# Patient Record
Sex: MALE | Race: OTHER | HISPANIC OR LATINO | Employment: UNEMPLOYED | URBAN - METROPOLITAN AREA
[De-identification: names, ages, dates, MRNs, and addresses within clinical notes are randomized per-mention and may not be internally consistent; named-entity substitution may affect disease eponyms.]

---

## 2020-09-04 ENCOUNTER — OFFICE VISIT (OUTPATIENT)
Dept: FAMILY MEDICINE CLINIC | Facility: CLINIC | Age: 60
End: 2020-09-04

## 2020-09-04 VITALS
OXYGEN SATURATION: 98 % | HEART RATE: 58 BPM | TEMPERATURE: 98.3 F | BODY MASS INDEX: 30.66 KG/M2 | WEIGHT: 207 LBS | SYSTOLIC BLOOD PRESSURE: 136 MMHG | HEIGHT: 69 IN | DIASTOLIC BLOOD PRESSURE: 80 MMHG

## 2020-09-04 DIAGNOSIS — R35.0 URINARY FREQUENCY: Primary | ICD-10-CM

## 2020-09-04 LAB
SL AMB  POCT GLUCOSE, UA: NORMAL
SL AMB LEUKOCYTE ESTERASE,UA: NORMAL
SL AMB POCT BILIRUBIN,UA: NORMAL
SL AMB POCT BLOOD,UA: NORMAL
SL AMB POCT CLARITY,UA: CLEAR
SL AMB POCT COLOR,UA: YELLOW
SL AMB POCT HEMOGLOBIN AIC: 6 (ref ?–6.5)
SL AMB POCT KETONES,UA: NORMAL
SL AMB POCT NITRITE,UA: NORMAL
SL AMB POCT PH,UA: 6.5
SL AMB POCT SPECIFIC GRAVITY,UA: 1.01
SL AMB POCT URINE PROTEIN: NORMAL
SL AMB POCT UROBILINOGEN: 0.2

## 2020-09-04 PROCEDURE — 99203 OFFICE O/P NEW LOW 30 MIN: CPT | Performed by: NURSE PRACTITIONER

## 2020-09-04 PROCEDURE — 83036 HEMOGLOBIN GLYCOSYLATED A1C: CPT | Performed by: NURSE PRACTITIONER

## 2020-09-04 PROCEDURE — 81002 URINALYSIS NONAUTO W/O SCOPE: CPT | Performed by: NURSE PRACTITIONER

## 2020-09-04 RX ORDER — TAMSULOSIN HYDROCHLORIDE 0.4 MG/1
0.4 CAPSULE ORAL
Qty: 30 CAPSULE | Refills: 2 | Status: SHIPPED | OUTPATIENT
Start: 2020-09-04 | End: 2020-12-08 | Stop reason: SDUPTHER

## 2020-09-04 NOTE — PROGRESS NOTES
BMI Counseling: Body mass index is 31 02 kg/m²  The BMI is above normal  Nutrition recommendations include encouraging healthy choices of fruits and vegetables, decreasing fast food intake, consuming healthier snacks, limiting drinks that contain sugar, increasing intake of lean protein, reducing intake of saturated and trans fat and reducing intake of cholesterol  Exercise recommendations include moderate physical activity 150 minutes/week  Depression Screening and Follow-up Plan: Patient's depression screening was positive with a PHQ-2 score of 0  Clincally patient does not have depression  No treatment is required  Assessment/Plan:    Urinary frequency  -POCT urine normal free from infection  On exam found prostate to be mildly enlarged  Discussed with patient starting on flomax to trial out x 1 month and see if his symptoms resolve  A1c was also normal done in office  To f/u in 1 month for re-evaluation  Diagnoses and all orders for this visit:    Urinary frequency  -     POCT urine dip  -     tamsulosin (FLOMAX) 0 4 mg; Take 1 capsule (0 4 mg total) by mouth daily with dinner  -     POCT hemoglobin A1c          Subjective:      Patient ID: Josiane Manning is a 61 y o  male  61year old male patient here to establish care  PMHx: no signficant past medical history  Urinary Frequency    This is a new problem  The current episode started more than 1 month ago (few months)  The problem occurs every urination  The problem has been gradually worsening  The pain is at a severity of 0/10  The patient is experiencing no pain  There has been no fever  The fever has been present for less than 1 day  He is sexually active  There is no history of pyelonephritis  Associated symptoms include frequency  Pertinent negatives include no chills, discharge, flank pain, hematuria, nausea, urgency or vomiting  He has tried nothing for the symptoms  The treatment provided no relief   There is no history of catheterization, kidney stones, recurrent UTIs, a single kidney, urinary stasis or a urological procedure  The following portions of the patient's history were reviewed and updated as appropriate: allergies, current medications, past family history, past medical history, past social history, past surgical history and problem list     Review of Systems   Constitutional: Negative  Negative for chills, fatigue and fever  HENT: Negative  Eyes: Negative  Respiratory: Negative  Negative for cough, chest tightness, shortness of breath and wheezing  Cardiovascular: Negative  Negative for chest pain and palpitations  Gastrointestinal: Negative  Negative for nausea and vomiting  Endocrine: Negative  Genitourinary: Positive for frequency  Negative for flank pain, hematuria and urgency  Incomplete emptying and weak stream   Musculoskeletal: Negative  Skin: Negative  Allergic/Immunologic: Negative  Neurological: Negative  Negative for dizziness and headaches  Hematological: Negative  Psychiatric/Behavioral: Negative  Objective:      /80 (BP Location: Left arm, Patient Position: Sitting, Cuff Size: Adult)   Pulse 58   Temp 98 3 °F (36 8 °C) (Oral)   Ht 5' 8 5" (1 74 m)   Wt 93 9 kg (207 lb)   SpO2 98%   BMI 31 02 kg/m²          Physical Exam  Vitals signs and nursing note reviewed  Constitutional:       General: He is not in acute distress  Appearance: Normal appearance  He is obese  He is not ill-appearing, toxic-appearing or diaphoretic  HENT:      Head: Normocephalic and atraumatic  Right Ear: External ear normal       Left Ear: External ear normal       Nose: Nose normal  No congestion or rhinorrhea  Mouth/Throat:      Mouth: Mucous membranes are moist       Pharynx: Oropharynx is clear  No oropharyngeal exudate  Eyes:      Extraocular Movements: Extraocular movements intact        Conjunctiva/sclera: Conjunctivae normal       Pupils: Pupils are equal, round, and reactive to light  Neck:      Musculoskeletal: Normal range of motion and neck supple  Cardiovascular:      Rate and Rhythm: Normal rate and regular rhythm  Pulses: Normal pulses  Heart sounds: Normal heart sounds  Pulmonary:      Effort: Pulmonary effort is normal  No respiratory distress  Breath sounds: Normal breath sounds  Abdominal:      General: Bowel sounds are normal       Palpations: Abdomen is soft  Genitourinary:     Prostate: Enlarged  Rectum: Normal    Musculoskeletal: Normal range of motion  Skin:     General: Skin is warm and dry  Capillary Refill: Capillary refill takes less than 2 seconds  Neurological:      General: No focal deficit present  Mental Status: He is alert and oriented to person, place, and time  Psychiatric:         Mood and Affect: Mood normal          Behavior: Behavior normal          Thought Content:  Thought content normal          Judgment: Judgment normal

## 2020-09-08 PROBLEM — R35.0 URINARY FREQUENCY: Status: ACTIVE | Noted: 2020-09-08

## 2020-09-08 NOTE — ASSESSMENT & PLAN NOTE
-POCT urine normal free from infection  On exam found prostate to be mildly enlarged  Discussed with patient starting on flomax to trial out x 1 month and see if his symptoms resolve  A1c was also normal done in office  To f/u in 1 month for re-evaluation

## 2020-10-05 DIAGNOSIS — R97.20 ELEVATED PSA: Primary | ICD-10-CM

## 2020-10-16 ENCOUNTER — OFFICE VISIT (OUTPATIENT)
Dept: FAMILY MEDICINE CLINIC | Facility: CLINIC | Age: 60
End: 2020-10-16

## 2020-10-16 ENCOUNTER — TELEPHONE (OUTPATIENT)
Dept: UROLOGY | Facility: MEDICAL CENTER | Age: 60
End: 2020-10-16

## 2020-10-16 VITALS
BODY MASS INDEX: 32.43 KG/M2 | OXYGEN SATURATION: 98 % | DIASTOLIC BLOOD PRESSURE: 80 MMHG | HEART RATE: 78 BPM | HEIGHT: 68 IN | TEMPERATURE: 97.5 F | SYSTOLIC BLOOD PRESSURE: 132 MMHG | RESPIRATION RATE: 18 BRPM | WEIGHT: 214 LBS

## 2020-10-16 DIAGNOSIS — R97.20 ELEVATED PSA: ICD-10-CM

## 2020-10-16 DIAGNOSIS — R35.0 URINARY FREQUENCY: Primary | ICD-10-CM

## 2020-10-16 DIAGNOSIS — D69.6 THROMBOCYTOPENIA (HCC): ICD-10-CM

## 2020-10-16 PROCEDURE — 99213 OFFICE O/P EST LOW 20 MIN: CPT | Performed by: NURSE PRACTITIONER

## 2020-12-08 ENCOUNTER — CONSULT (OUTPATIENT)
Dept: UROLOGY | Age: 60
End: 2020-12-08

## 2020-12-08 VITALS
DIASTOLIC BLOOD PRESSURE: 88 MMHG | WEIGHT: 212 LBS | HEIGHT: 68 IN | SYSTOLIC BLOOD PRESSURE: 148 MMHG | BODY MASS INDEX: 32.13 KG/M2

## 2020-12-08 DIAGNOSIS — Z12.11 ENCOUNTER FOR SCREENING COLONOSCOPY: ICD-10-CM

## 2020-12-08 DIAGNOSIS — R97.20 ELEVATED PSA: Primary | ICD-10-CM

## 2020-12-08 DIAGNOSIS — N40.1 BENIGN PROSTATIC HYPERPLASIA WITH URINARY FREQUENCY: ICD-10-CM

## 2020-12-08 DIAGNOSIS — R35.0 BENIGN PROSTATIC HYPERPLASIA WITH URINARY FREQUENCY: ICD-10-CM

## 2020-12-08 PROCEDURE — 99244 OFF/OP CNSLTJ NEW/EST MOD 40: CPT | Performed by: UROLOGY

## 2020-12-08 RX ORDER — TAMSULOSIN HYDROCHLORIDE 0.4 MG/1
0.4 CAPSULE ORAL
Qty: 90 CAPSULE | Refills: 3 | Status: SHIPPED | OUTPATIENT
Start: 2020-12-08

## 2020-12-15 ENCOUNTER — TELEPHONE (OUTPATIENT)
Dept: GASTROENTEROLOGY | Facility: CLINIC | Age: 60
End: 2020-12-15

## 2020-12-21 ENCOUNTER — TELEPHONE (OUTPATIENT)
Dept: GASTROENTEROLOGY | Facility: CLINIC | Age: 60
End: 2020-12-21

## 2021-01-27 ENCOUNTER — TELEPHONE (OUTPATIENT)
Dept: GASTROENTEROLOGY | Facility: MEDICAL CENTER | Age: 61
End: 2021-01-27

## 2021-02-09 ENCOUNTER — OFFICE VISIT (OUTPATIENT)
Dept: UROLOGY | Age: 61
End: 2021-02-09

## 2021-02-09 VITALS
HEIGHT: 70 IN | DIASTOLIC BLOOD PRESSURE: 80 MMHG | BODY MASS INDEX: 30.35 KG/M2 | WEIGHT: 212 LBS | HEART RATE: 78 BPM | SYSTOLIC BLOOD PRESSURE: 130 MMHG

## 2021-02-09 DIAGNOSIS — N40.1 BENIGN PROSTATIC HYPERPLASIA WITH URINARY FREQUENCY: ICD-10-CM

## 2021-02-09 DIAGNOSIS — R97.20 ELEVATED PSA: Primary | ICD-10-CM

## 2021-02-09 DIAGNOSIS — R35.0 BENIGN PROSTATIC HYPERPLASIA WITH URINARY FREQUENCY: ICD-10-CM

## 2021-02-09 PROCEDURE — 99214 OFFICE O/P EST MOD 30 MIN: CPT | Performed by: UROLOGY

## 2021-02-09 RX ORDER — CIPROFLOXACIN 500 MG/1
TABLET, FILM COATED ORAL
Qty: 2 TABLET | Refills: 0 | Status: SHIPPED | OUTPATIENT
Start: 2021-03-08 | End: 2021-03-09

## 2021-02-09 NOTE — ASSESSMENT & PLAN NOTE
PSA has risen to 8 79 with 3% free  The risk of prostate cancer was discussed  I recommended to the patient that we proceed with transrectal ultrasound of the prostate with biopsy  The procedure was described  Risks were discussed

## 2021-02-09 NOTE — ASSESSMENT & PLAN NOTE
The patient notes that his voiding pattern has improved with the use of tamsulosin  He is satisfied his voiding pattern at this point and we will continue present therapy

## 2021-02-09 NOTE — PROGRESS NOTES
Assessment/Plan:    Elevated PSA  PSA has risen to 8 79 with 3% free  The risk of prostate cancer was discussed  I recommended to the patient that we proceed with transrectal ultrasound of the prostate with biopsy  The procedure was described  Risks were discussed  Benign prostatic hyperplasia with urinary frequency  The patient notes that his voiding pattern has improved with the use of tamsulosin  He is satisfied his voiding pattern at this point and we will continue present therapy  Diagnoses and all orders for this visit:    Elevated PSA  -     Biopsy prostate; Future  -     ciprofloxacin (CIPRO) 500 mg tablet; 1 pill orally 1 hour before prostate biopsy, 1 pill 12 hours post biopsy  Benign prostatic hyperplasia with urinary frequency          Subjective:      Patient ID: Milo Lin is a 61 y o  male  Benign Prostatic Hypertrophy  This is a new problem  The current episode started more than 1 month ago  The problem has been gradually improving since onset  Irritative symptoms include urgency  Nocturia: Nocturia x 2-3  Obstructive symptoms include a slower stream  Obstructive symptoms do not include incomplete emptying, an intermittent stream, straining or a weak stream  Pertinent negatives include no chills, dysuria, genital pain, hematuria, hesitancy, nausea or vomiting  He is sexually active  The symptoms are aggravated by alcohol  Past treatments include tamsulosin  The treatment provided mild relief  The following portions of the patient's history were reviewed and updated as appropriate: allergies, current medications, past family history, past medical history, past social history, past surgical history and problem list     Review of Systems   Constitutional: Negative for chills, diaphoresis, fatigue and fever  HENT: Negative  Eyes: Negative  Respiratory: Negative  Cardiovascular: Negative  Gastrointestinal: Negative  Negative for nausea and vomiting          Never had colonoscopy   Endocrine: Negative  Genitourinary: Positive for urgency  Negative for dysuria, hematuria, hesitancy and incomplete emptying  Nocturia: Nocturia x 2-3  See HPI   Musculoskeletal: Negative  Skin: Negative  Allergic/Immunologic: Negative  Neurological: Negative  Hematological: Negative  Psychiatric/Behavioral: Negative  Objective:      /80   Pulse 78   Ht 5' 10" (1 778 m)   Wt 96 2 kg (212 lb)   BMI 30 42 kg/m²          Physical Exam  Vitals signs reviewed  Constitutional:       General: He is not in acute distress  Appearance: Normal appearance  He is well-developed  He is not ill-appearing, toxic-appearing or diaphoretic  HENT:      Head: Normocephalic and atraumatic  Eyes:      General: No scleral icterus  Conjunctiva/sclera: Conjunctivae normal    Neck:      Musculoskeletal: Neck supple  Cardiovascular:      Rate and Rhythm: Normal rate  Pulmonary:      Effort: Pulmonary effort is normal    Abdominal:      General: Bowel sounds are normal  There is no distension  Palpations: Abdomen is soft  There is no mass  Tenderness: There is no abdominal tenderness  There is no right CVA tenderness, left CVA tenderness, guarding or rebound  Hernia: No hernia is present  Comments: Large midline incision well-healed   Genitourinary:     Penis: No phimosis or hypospadias  Scrotum/Testes:         Right: Mass not present  Left: Mass not present  Musculoskeletal: Normal range of motion  Skin:     General: Skin is warm and dry  Neurological:      General: No focal deficit present  Mental Status: He is alert and oriented to person, place, and time  Psychiatric:         Mood and Affect: Mood normal          Behavior: Behavior normal          Thought Content:  Thought content normal          Judgment: Judgment normal

## 2021-02-09 NOTE — PATIENT INSTRUCTIONS
Prostate Biopsy   WHAT YOU NEED TO KNOW:   What do I need to know about a prostate biopsy? A prostate biopsy is a procedure to remove samples of tissue from your prostate gland  The prostate is a male sex gland that makes fluid found in semen  It is located just below the bladder  After the samples are removed, they are sent to a lab and tested for cancer  How do I prepare for a prostate biopsy? · Your healthcare provider will talk to you about how to prepare for this procedure  You will need to stop taking blood thinners several days before your procedure  Examples of blood thinners include warfarin and NSAIDs  You may also need to stop taking herbal supplements before your procedure  Your provider may tell you to shower the night before your surgery  He or she may tell you to use a certain soap to help prevent a surgical site infection  Your provider may tell you not to eat or drink anything after midnight on the day of your surgery  He or she will tell you what other medicines to take or not take on the day of your procedure  · You will be given an antibiotic to help prevent a bacterial infection  You may need to give yourself an enema (liquid medicine put in your rectum) to help empty your bowel before your procedure  What will happen during a prostate biopsy? · You may need to lie on your side with your knees pulled up toward your chest  Numbing cream or gel may be put into your rectum, or numbing medicine may be injected near your prostate  You may instead be given general anesthesia to keep you asleep and free from pain during the procedure  A biopsy sample may be taken through your rectum, urethra, or perineum  The perineum is the area between your scrotum and rectum  Most of the time, biopsy samples are taken through the rectum  · If your healthcare provider takes a sample through your rectum, he will insert a small ultrasound probe into your rectum   The ultrasound shows pictures of your prostate on a monitor, and is used to help guide the biopsy needle  Your healthcare provider will push the biopsy needle through the wall of your rectum and into your prostate gland  Your healthcare provider will remove between 6 to 12 samples of tissue from different areas of your prostate gland  The samples will be sent to a lab and tested for cancer  What will happen after a prostate biopsy? You may feel soreness at the biopsy site  You may need to take antibiotics for up to 2 days after your procedure to help prevent an infection  You may have some bleeding from your rectum  You may also have blood in your urine, bowel movements, or semen  What are the risks of a prostate biopsy? You may bleed more than expected or get an infection in your urinary tract or prostate gland  The infection may spread to your blood and the rest of your body  Your bladder may not empty completely when you urinate  You may need a catheter to help empty your bladder for a short period of time  Cancer cells may be missed during your biopsy procedure  You may need another prostate biopsy to check for cancer again  CARE AGREEMENT:   You have the right to help plan your care  Learn about your health condition and how it may be treated  Discuss treatment options with your healthcare providers to decide what care you want to receive  You always have the right to refuse treatment  The above information is an  only  It is not intended as medical advice for individual conditions or treatments  Talk to your doctor, nurse or pharmacist before following any medical regimen to see if it is safe and effective for you  © Copyright [address] Information is for End User's use only and may not be sold, redistributed or otherwise used for commercial purposes   All illustrations and images included in CareNotes® are the copyrighted property of [employer]  or [address] [place], [place] [address]

## 2021-02-10 ENCOUNTER — TELEPHONE (OUTPATIENT)
Dept: FAMILY MEDICINE CLINIC | Facility: CLINIC | Age: 61
End: 2021-02-10

## 2021-04-26 ENCOUNTER — PROCEDURE VISIT (OUTPATIENT)
Dept: UROLOGY | Facility: MEDICAL CENTER | Age: 61
End: 2021-04-26

## 2021-04-26 VITALS
SYSTOLIC BLOOD PRESSURE: 132 MMHG | DIASTOLIC BLOOD PRESSURE: 82 MMHG | WEIGHT: 212 LBS | BODY MASS INDEX: 30.35 KG/M2 | HEIGHT: 70 IN

## 2021-04-26 DIAGNOSIS — R97.20 ELEVATED PSA: Primary | ICD-10-CM

## 2021-04-26 PROCEDURE — 96372 THER/PROPH/DIAG INJ SC/IM: CPT | Performed by: UROLOGY

## 2021-04-26 PROCEDURE — 88342 IMHCHEM/IMCYTCHM 1ST ANTB: CPT | Performed by: PATHOLOGY

## 2021-04-26 PROCEDURE — 88344 IMHCHEM/IMCYTCHM EA MLT ANTB: CPT | Performed by: PATHOLOGY

## 2021-04-26 PROCEDURE — 55700 PR BIOPSY OF PROSTATE,NEEDLE/PUNCH: CPT | Performed by: UROLOGY

## 2021-04-26 PROCEDURE — G0416 PROSTATE BIOPSY, ANY MTHD: HCPCS | Performed by: PATHOLOGY

## 2021-04-26 PROCEDURE — 76942 ECHO GUIDE FOR BIOPSY: CPT | Performed by: UROLOGY

## 2021-04-26 RX ORDER — CEFTRIAXONE SODIUM 250 MG/1
250 INJECTION, POWDER, FOR SOLUTION INTRAMUSCULAR; INTRAVENOUS ONCE
Status: DISCONTINUED | OUTPATIENT
Start: 2021-04-26 | End: 2021-04-26

## 2021-04-26 RX ORDER — CEFTRIAXONE 1 G/1
1000 INJECTION, POWDER, FOR SOLUTION INTRAMUSCULAR; INTRAVENOUS ONCE
Status: COMPLETED | OUTPATIENT
Start: 2021-04-26 | End: 2021-04-26

## 2021-04-26 RX ADMIN — CEFTRIAXONE 1000 MG: 1 INJECTION, POWDER, FOR SOLUTION INTRAMUSCULAR; INTRAVENOUS at 11:17

## 2021-04-26 NOTE — LETTER
April 26, 2021     Dvae Zimmerman MD  [address]  [address] [address]  [address]    Patient: Jose Armando Bazzi   YOB: 1960   Date of Visit: 4/26/2021       Dear Dr Montana Santo: Thank you for referring Jose Armando Bazzi to me for evaluation  Below are my notes for this consultation  If you have questions, please do not hesitate to call me  I look forward to following your patient along with you  Sincerely,        Daniela Nguyễn MD        CC: No Recipients  Daniela Nguyễn MD  4/26/2021 10:43 AM  Sign when Signing Visit        Biopsy prostate     Date/Time 4/26/2021 10:39 AM     Performed by  Daniela Nguyễn MD     Authorized by Daniela Nguyễn MD      Universal Protocol   Consent: Verbal consent obtained  Written consent obtained  Risks and benefits: risks, benefits and alternatives were discussed  Consent given by: patient  Patient consent: the patient's understanding of the procedure matches consent given  Required items: required blood products, implants, devices, and special equipment available  Patient identity confirmed: verbally with patient        Local anesthesia used: yes     Anesthesia   Local anesthesia used: yes  Local Anesthetic: lidocaine 1% without epinephrine  Anesthetic total: 10 mL     Sedation   Patient sedated: no        Specimen: yes    Culture: no   Procedure Details   Procedure Notes: The patient was brought to the procedure room and properly identified  Gentamicin was given intramuscularly as ordered  The patient was then placed in the left lateral position  Digital rectal examination was performed  JOHN reveals  Moderately enlarged benign-feeling prostate  Betadine was instilled into the rectum  The transrectal ultrasound probe was placed in the rectum  Transrectal ultrasound of prostate then commenced  Findings on ultrasound:  Normal seminal vesicles  No hypoechoic lesion is noted the peripheral zone    The transition zone is enlarged and Stood with patient at the bedside to obtain standing vital signs. Patient denies dizziness, lightheadedness, or weakness. Patient declines assistance getting dressed. Call light within reach of patient.      heterogeneous in echotexture  Prostate size is 26 g  After completion  of the ultrasound a prostate block was administered in standard fashion using 2% xylocaine without epinephrine and a spinal needle  Transrectal ultrasound-guided biopsies were then obtained  12 biopsies were obtained with standard template  Biopsies were directed medially and laterally on both sides of the gland at the base, mid gland and apex  No nina prostatic hematoma was noted  The procedure was well tolerated  The patient was given discharge instructions  He left the procedure room in satisfactory condition    Patient Transportation: confirmed  Patient tolerance: patient tolerated the procedure well with no immediate complications

## 2021-04-26 NOTE — PATIENT INSTRUCTIONS
Prostate Gland Needle Biopsy   WHAT YOU NEED TO KNOW:   A prostate gland needle biopsy is a procedure to remove samples of tissue from your prostate gland  The prostate is a gland located just below the bladder and surrounds the urethra (tube that carries urine out of the body)  DISCHARGE INSTRUCTIONS:   Seek care immediately if:   · You bleed from your rectum  · You urinate very little or not at all  · You have severe pain, even after you take pain medicine  Call your doctor or surgeon if:   · You feel pain or burning when you urinate  · You feel weak, and your face is hot and red  · You have a fever or chills  · You see blood in your urine, bowel movements, or semen  · Your urine is cloudy or smells foul  · You cannot get an erection  · You have questions or concerns about your condition or care  Medicines: You may need any of the following:  · NSAIDs , such as ibuprofen, help decrease swelling, pain, and fever  NSAIDs can cause stomach bleeding or kidney problems in certain people  If you take blood thinner medicine, always ask your healthcare provider if NSAIDs are safe for you  Always read the medicine label and follow directions  · Antibiotics  help treat or prevent an infection caused by bacteria  · Prescription pain medicine  may be given  Ask your healthcare provider how to take this medicine safely  Some prescription pain medicines contain acetaminophen  Do not take other medicines that contain acetaminophen without talking to your healthcare provider  Too much acetaminophen may cause liver damage  Prescription pain medicine may cause constipation  Ask your healthcare provider how to prevent or treat constipation  · Take your medicine as directed  Contact your healthcare provider if you think your medicine is not helping or if you have side effects  Tell him or her if you are allergic to any medicine   Keep a list of the medicines, vitamins, and herbs you take  Include the amounts, and when and why you take them  Bring the list or the pill bottles to follow-up visits  Carry your medicine list with you in case of an emergency  Eat a variety of healthy foods:  Healthy foods include fruits, vegetables, whole-grain breads, low-fat dairy products, beans, lean meats, and fish  Eat foods low in animal fat and saturated fats to help decrease your risk for prostate cancer  Follow up with your doctor or surgeon as directed:  Write down your questions so you remember to ask them during your visits  © Copyright [address] [place] [address] Drive Information is for End User's use only and may not be sold, redistributed or otherwise used for commercial purposes  All illustrations and images included in CareNotes® are the copyrighted property of [employer]  or [facility] [employer]   The above information is an  only  It is not intended as medical advice for individual conditions or treatments  Talk to your doctor, nurse or pharmacist before following any medical regimen to see if it is safe and effective for you

## 2021-04-26 NOTE — PROGRESS NOTES
Biopsy prostate     Date/Time 4/26/2021 10:39 AM     Performed by  Jana Pruett MD     Authorized by Jana Pruett MD      Universal Protocol   Consent: Verbal consent obtained  Written consent obtained  Risks and benefits: risks, benefits and alternatives were discussed  Consent given by: patient  Patient consent: the patient's understanding of the procedure matches consent given  Required items: required blood products, implants, devices, and special equipment available  Patient identity confirmed: verbally with patient        Local anesthesia used: yes     Anesthesia   Local anesthesia used: yes  Local Anesthetic: lidocaine 1% without epinephrine  Anesthetic total: 10 mL     Sedation   Patient sedated: no        Specimen: yes    Culture: no   Procedure Details   Procedure Notes: The patient was brought to the procedure room and properly identified  Rocephin was given intramuscularly as ordered  Oral ciprofloxacin was given preoperatively as well  The patient was then placed in the left lateral position  Digital rectal examination was performed  JOHN reveals  Moderately enlarged benign-feeling prostate  Betadine was instilled into the rectum  The transrectal ultrasound probe was placed in the rectum  Transrectal ultrasound of prostate then commenced  Findings on ultrasound:  Normal seminal vesicles  No hypoechoic lesion is noted the peripheral zone  The transition zone is enlarged and heterogeneous in echotexture  Prostate size is 26 g  After completion  of the ultrasound a prostate block was administered in standard fashion using 2% xylocaine without epinephrine and a spinal needle  Transrectal ultrasound-guided biopsies were then obtained  12 biopsies were obtained with standard template  Biopsies were directed medially and laterally on both sides of the gland at the base, mid gland and apex  No nina prostatic hematoma was noted  The procedure was well tolerated  The patient was given discharge instructions  He will take another dose of ciprofloxacin in approximately 12 hours  He left the procedure room in satisfactory condition    Patient Transportation: confirmed  Patient tolerance: patient tolerated the procedure well with no immediate complications

## 2021-05-11 ENCOUNTER — OFFICE VISIT (OUTPATIENT)
Dept: UROLOGY | Facility: MEDICAL CENTER | Age: 61
End: 2021-05-11

## 2021-05-11 VITALS
DIASTOLIC BLOOD PRESSURE: 80 MMHG | SYSTOLIC BLOOD PRESSURE: 130 MMHG | HEIGHT: 70 IN | HEART RATE: 69 BPM | WEIGHT: 209 LBS | BODY MASS INDEX: 29.92 KG/M2

## 2021-05-11 DIAGNOSIS — C61 PROSTATE CANCER (HCC): Primary | ICD-10-CM

## 2021-05-11 PROCEDURE — 99214 OFFICE O/P EST MOD 30 MIN: CPT | Performed by: UROLOGY

## 2021-05-11 NOTE — PROGRESS NOTES
Chief complaint:  Elevated PSA    HPI:  57-year-old male followed for an elevated PSA  He recently underwent transrectal ultrasound with prostate biopsy  The procedure was well tolerated  He does note he is voiding a little more frequently but has no dysuria or gross hematuria  There is no fever or flank pain  He is here today to review his pathology  He is accompanied by his sister who speaks [place] well  PSA:  8 79    Transrectal ultrasound:  26 g prostate without hypoechoic lesion    Prostate biopsies:  7 of the 12 biopsies were positive for prostate cancer with maximum Pax score of 4+4=8  I had a long talk with the patient and his sister and reviewed the pathology report  He has high risk prostate cancer  We reviewed the NCCN guidelines  I recommended obtaining a bone scan and a CT scan  He will then return for a discussion regarding treatment options  We did have an initial talk a regarding treatment options for prostate cancer  Specifically we discussed robotic prostatectomy, radiation therapy options as well as medical options should the cancer have spread  At the conclusion were discussion the patient understood the nature of the prostate cancer  He will obtain his testing and return for further discussions

## 2021-05-12 ENCOUNTER — TELEPHONE (OUTPATIENT)
Dept: FAMILY MEDICINE CLINIC | Facility: CLINIC | Age: 61
End: 2021-05-12

## 2021-05-14 ENCOUNTER — APPOINTMENT (OUTPATIENT)
Dept: LAB | Facility: HOSPITAL | Age: 61
End: 2021-05-14
Attending: UROLOGY

## 2021-05-14 DIAGNOSIS — C61 PROSTATE CANCER (HCC): ICD-10-CM

## 2021-05-14 LAB
ALBUMIN SERPL BCP-MCNC: 3.6 G/DL (ref 3.5–5)
ALP SERPL-CCNC: 107 U/L (ref 46–116)
ALT SERPL W P-5'-P-CCNC: 33 U/L (ref 12–78)
ANION GAP SERPL CALCULATED.3IONS-SCNC: 11 MMOL/L (ref 4–13)
AST SERPL W P-5'-P-CCNC: 18 U/L (ref 5–45)
BILIRUB SERPL-MCNC: 0.8 MG/DL (ref 0.2–1)
BUN SERPL-MCNC: 13 MG/DL (ref 5–25)
CALCIUM SERPL-MCNC: 8.9 MG/DL (ref 8.3–10.1)
CHLORIDE SERPL-SCNC: 106 MMOL/L (ref 100–108)
CO2 SERPL-SCNC: 26 MMOL/L (ref 21–32)
CREAT SERPL-MCNC: 1.05 MG/DL (ref 0.6–1.3)
GFR SERPL CREATININE-BSD FRML MDRD: 76 ML/MIN/1.73SQ M
GLUCOSE SERPL-MCNC: 100 MG/DL (ref 65–140)
POTASSIUM SERPL-SCNC: 4.4 MMOL/L (ref 3.5–5.3)
PROT SERPL-MCNC: 8 G/DL (ref 6.4–8.2)
SODIUM SERPL-SCNC: 143 MMOL/L (ref 136–145)

## 2021-05-14 PROCEDURE — 80053 COMPREHEN METABOLIC PANEL: CPT

## 2021-05-14 PROCEDURE — 36415 COLL VENOUS BLD VENIPUNCTURE: CPT

## 2021-05-17 ENCOUNTER — HOSPITAL ENCOUNTER (OUTPATIENT)
Dept: CT IMAGING | Facility: HOSPITAL | Age: 61
Discharge: HOME | End: 2021-05-17
Attending: UROLOGY

## 2021-05-17 ENCOUNTER — HOSPITAL ENCOUNTER (OUTPATIENT)
Dept: RADIOLOGY | Facility: HOSPITAL | Age: 61
Discharge: HOME | End: 2021-05-17
Attending: UROLOGY

## 2021-05-17 DIAGNOSIS — C61 PROSTATE CANCER (HCC): ICD-10-CM

## 2021-05-17 PROCEDURE — 74177 CT ABD & PELVIS W/CONTRAST: CPT

## 2021-05-17 PROCEDURE — G1004 CDSM NDSC: HCPCS

## 2021-05-17 PROCEDURE — A9503 TC99M MEDRONATE: HCPCS

## 2021-05-17 PROCEDURE — 78306 BONE IMAGING WHOLE BODY: CPT

## 2021-05-17 RX ADMIN — IOHEXOL 100 ML: 350 INJECTION, SOLUTION INTRAVENOUS at 11:26

## 2021-05-21 ENCOUNTER — TELEPHONE (OUTPATIENT)
Dept: UROLOGY | Facility: AMBULATORY SURGERY CENTER | Age: 61
End: 2021-05-21

## 2021-05-21 NOTE — TELEPHONE ENCOUNTER
Patient's sister Rosie Hernandez called the office today to see if we got results of imaging yet  Bone scan was read, but CT has yet to be read, but when that is completed, she would like a call back to discuss next steps in her brother's care  Thank you!

## 2021-05-21 NOTE — RESULT ENCOUNTER NOTE
Inform pt that the  test was normal   He needs appointment to discuss treatment of his prostate cancer

## 2021-05-21 NOTE — TELEPHONE ENCOUNTER
Spoke to pt's sister Karthik Bonilla [phone] and advised CT results are not finalized yet but that Radiology was called and the results will be forthcoming  Will contact pt to schedule OV with Dr Alma Mendez to discuss CT report

## 2021-05-24 NOTE — TELEPHONE ENCOUNTER
FYI: No action required  Patients sister returned call   Patient has been scheduled with Dr Josiane Morel 30min slot on 5/25 at 2:30pm

## 2021-05-24 NOTE — TELEPHONE ENCOUNTER
Call placed to sister Landon Primrose  [facility] for her to contact the office to schedule a follow up appointment with Dr Dakota Miguel to review CT scan and NM bone scan  Office number was provided for call back       **It sister or patient calls back, please offer her an appointment tomorrow with Dr Dakota Miguel for discussion in 30 min slot**

## 2021-05-25 ENCOUNTER — OFFICE VISIT (OUTPATIENT)
Dept: UROLOGY | Facility: MEDICAL CENTER | Age: 61
End: 2021-05-25

## 2021-05-25 VITALS
BODY MASS INDEX: 29.92 KG/M2 | SYSTOLIC BLOOD PRESSURE: 128 MMHG | DIASTOLIC BLOOD PRESSURE: 78 MMHG | HEIGHT: 70 IN | WEIGHT: 209 LBS

## 2021-05-25 DIAGNOSIS — C61 PROSTATE CANCER (HCC): Primary | ICD-10-CM

## 2021-05-25 PROCEDURE — 99214 OFFICE O/P EST MOD 30 MIN: CPT | Performed by: UROLOGY

## 2021-05-25 NOTE — PROGRESS NOTES
Chief complaint:  Prostate cancer    HPI:  28-year-old male recently diagnosed with clinically localized high-risk prostate cancer  He recently completed a bone scan and a CT scan and returns today to discuss treatment options  He reports that he feels well there has been no change in his voiding symptoms  There is no gross hematuria, dysuria or symptoms of infection  He does have significant lower urinary tract symptoms with AUA symptom score of 23 with quality of life of 6  A bone scan is negative for evidence of metastatic disease  A CT scan is negative for evidence of metastatic disease  AUA SYMPTOM SCORE      Most Recent Value   AUA SYMPTOM SCORE   How often have you had a sensation of not emptying your bladder completely after you finished urinating? 3   How often have you had to urinate again less than two hours after you finished urinating? 3   How often have you found you stopped and started again several times when you urinate? 4   How often have you found it difficult to postpone urination? 3   How often have you had a weak urinary stream?  4   How often have you had to push or strain to begin urination? 2   How many times did you most typically get up to urinate from the time you went to bed at night until the time you got up in the morning? 4   Quality of Life: If you were to spend the rest of your life with your urinary condition just the way it is now, how would you feel about that?  6   AUA SYMPTOM SCORE  23          I had a long discussion with the patient and his family regarding the treatment of clinically localized prostate cancer  Specifically we discussed IMRT with neoadjuvant hormonal therapy and radical prostatectomy  The pros and cons of each were described  The risks of each were discussed    At the conclusion were discussion I recommended to the patient that he seek consultation with a robotic surgeon and with the radiation oncologist   Referrals were placed to Radiation Oncology  The patient will follow-up with urology  later in the week to discuss robotic surgery  He knows I would be happy to see him in the future to discuss treatment options further if he needs help making a decision

## 2021-05-25 NOTE — PATIENT INSTRUCTIONS
Prostate Cancer, Ambulatory Care   GENERAL INFORMATION:   Prostate cancer, Ambulatory Care develops in the male sex gland that helps make semen (prostate)  It is about the size of a walnut and wraps around the urethra  The urethra is the tube that carries urine from the bladder to the end of the penis  In most cases, prostate cancer is slow growing  Common symptoms include the following:   · Trouble starting or stopping the flow of urine    · Feeling the need to urinate often, especially at night    · Pain or a burning feeling when you urinate or ejaculate semen    · Trouble having an erection    · Blood in your urine or semen    · Not being able to urinate at all    · Pain or stiffness in your lower back, hips, or upper thighs  Seek immediate care for the following symptoms:   · Chest pain when you take a deep breath or cough    · Coughing up blood    · Suddenly feeling lightheaded and short of breath    · Your leg feels warm, tender, and painful  It may look swollen and red  Treatment for prostate cancer: If you have early stage cancer, your healthcare provider may recommend that you have frequent tests and regular follow-up visits to watch for changes  You may also need any of the following:  · Hormone therapy  is medicine used to decrease testosterone (male hormone) levels  · Radiation therapy  is used to kill cancer cells with high-energy x-rays beams  You may receive radiation therapy from outside your body or from small beads or rods placed inside your prostate  · Surgery  may be needed, depending on the stage of the cancer  Part or all of your prostate may be removed  You may also need to have some lymph nodes taken out  This may help keep the cancer from spreading to other parts of your body  Manage your prostate cancer:   · Eat a variety of healthy foods  Healthy foods include fruits, vegetables, whole-grain breads, low-fat dairy products, beans, lean meats, and fish   Your healthcare provider may also recommend changes to the amounts of calcium and vitamin D you have each day  · Manage your weight  Obesity may increase your risk for problems from prostate cancer  Limit or do not have high-calorie foods or drinks  · Exercise as directed  Exercise may help you recover after treatment and may help prevent your prostate cancer from returning  Exercise can also help you manage your weight  Try to get at least 30 minutes of exercise 5 days a week, such as walking  · Do not smoke  If you smoke, it is never too late to quit  Smoking increases the risk that your prostate cancer will return after treatment  Smoking also increases your risk for other types of cancer  Ask your healthcare provider for information if you need help quitting  · Limit or do not drink alcohol  If you drink, limit alcohol to 2 drinks per day  A drink is 12 ounces of beer, 1½ ounces of liquor, or 5 ounces of wine  Follow up with your urologist or oncologist as directed:  Write down your questions so you remember to ask them during your visits  CARE AGREEMENT:   You have the right to help plan your care  Learn about your health condition and how it may be treated  Discuss treatment options with your caregivers to decide what care you want to receive  You always have the right to refuse treatment  The above information is an  only  It is not intended as medical advice for individual conditions or treatments  Talk to your doctor, nurse or pharmacist before following any medical regimen to see if it is safe and effective for you  © 2014 [address] is for End User's use only and may not be sold, redistributed or otherwise used for commercial purposes  All illustrations and images included in CareNotes® are the copyrighted property of A D A M , Inc  or Jeremi García

## 2021-05-27 ENCOUNTER — OFFICE VISIT (OUTPATIENT)
Dept: UROLOGY | Facility: CLINIC | Age: 61
End: 2021-05-27

## 2021-05-27 ENCOUNTER — TELEPHONE (OUTPATIENT)
Dept: RADIATION ONCOLOGY | Facility: CLINIC | Age: 61
End: 2021-05-27

## 2021-05-27 VITALS
DIASTOLIC BLOOD PRESSURE: 80 MMHG | SYSTOLIC BLOOD PRESSURE: 140 MMHG | HEIGHT: 70 IN | WEIGHT: 209 LBS | BODY MASS INDEX: 29.92 KG/M2

## 2021-05-27 DIAGNOSIS — C61 PROSTATE CANCER (HCC): Primary | ICD-10-CM

## 2021-05-27 PROCEDURE — 99215 OFFICE O/P EST HI 40 MIN: CPT | Performed by: UROLOGY
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
## 2021-06-02 ENCOUNTER — TELEPHONE (OUTPATIENT)
Dept: RADIATION ONCOLOGY | Facility: CLINIC | Age: 61
End: 2021-06-02

## 2021-06-02 PROBLEM — C61 PROSTATE CANCER (HCC): Status: ACTIVE | Noted: 2021-06-02

## 2021-06-03 ENCOUNTER — RADIATION ONCOLOGY CONSULT (OUTPATIENT)
Dept: RADIATION ONCOLOGY | Facility: CLINIC | Age: 61
End: 2021-06-03
Attending: RADIOLOGY

## 2021-06-03 ENCOUNTER — TELEPHONE (OUTPATIENT)
Dept: UROLOGY | Facility: CLINIC | Age: 61
End: 2021-06-03

## 2021-06-03 ENCOUNTER — CLINICAL SUPPORT (OUTPATIENT)
Dept: RADIATION ONCOLOGY | Facility: CLINIC | Age: 61
End: 2021-06-03
Attending: RADIOLOGY

## 2021-06-03 ENCOUNTER — TELEPHONE (OUTPATIENT)
Dept: RADIATION ONCOLOGY | Facility: CLINIC | Age: 61
End: 2021-06-03

## 2021-06-03 VITALS
HEIGHT: 70 IN | HEART RATE: 68 BPM | OXYGEN SATURATION: 99 % | DIASTOLIC BLOOD PRESSURE: 86 MMHG | SYSTOLIC BLOOD PRESSURE: 128 MMHG | TEMPERATURE: 97.2 F | WEIGHT: 203.26 LBS | BODY MASS INDEX: 29.1 KG/M2 | RESPIRATION RATE: 18 BRPM

## 2021-06-03 DIAGNOSIS — C61 PROSTATE CANCER (HCC): Primary | ICD-10-CM

## 2021-06-03 PROCEDURE — 99211 OFF/OP EST MAY X REQ PHY/QHP: CPT | Performed by: RADIOLOGY

## 2021-06-03 NOTE — TELEPHONE ENCOUNTER
Called [phone] and left message for patient stating to keep his 06/04/2021 at 730 am appointment with Dr Geoffrey Izaguirre

## 2021-06-03 NOTE — TELEPHONE ENCOUNTER
----- Message from Champ Hopkins MD sent at 6/3/2021  2:39 PM EDT -----  FYI-should probably still see patient and make plan for treatment despite insurance issue  ----- Message -----  From: Orlando Chang RN  Sent: 6/3/2021   1:31 PM EDT  To: Champ Hopkins MD, Rosio Wyatt, ISACC, #    Hi,    Fidel saw Radiation Oncology today and is scheduled with Dr Guero Tobin tomorrow  He currently does not have insurance  He told the  in [address] [place] [address] today that he applied for MA but has not heard back from them  He will need either Radiation therapy or surgery for treatment of his prostate cancer  Would you be able to assist this patient?      Thank you,  KAYLEE Geronimo Nurse Navigator

## 2021-06-03 NOTE — TELEPHONE ENCOUNTER
Per Dr Andrew Lundy, patient to receive ADT, [employer], and markers  He is leaning towards RT over surgery, but will make final decision at Dr Arndt Organ appt tomorrow

## 2021-06-04 ENCOUNTER — OFFICE VISIT (OUTPATIENT)
Dept: UROLOGY | Facility: CLINIC | Age: 61
End: 2021-06-04

## 2021-06-04 ENCOUNTER — DOCUMENTATION (OUTPATIENT)
Dept: HEMATOLOGY ONCOLOGY | Facility: CLINIC | Age: 61
End: 2021-06-04

## 2021-06-04 VITALS
SYSTOLIC BLOOD PRESSURE: 132 MMHG | WEIGHT: 205.2 LBS | HEART RATE: 69 BPM | BODY MASS INDEX: 29.38 KG/M2 | HEIGHT: 70 IN | DIASTOLIC BLOOD PRESSURE: 88 MMHG

## 2021-06-04 DIAGNOSIS — C61 PROSTATE CANCER (HCC): Primary | ICD-10-CM

## 2021-06-04 PROCEDURE — 99215 OFFICE O/P EST HI 40 MIN: CPT | Performed by: UROLOGY

## 2021-06-04 NOTE — TELEPHONE ENCOUNTER
Kendra Barnes saw Dr Francisco Quintana in consult today and has decided to proceed with robotic prostatectomy

## 2021-06-04 NOTE — PROGRESS NOTES
6-1-21  [facility]  Per response received Yolie Solorzano  Was going to check [place]  There was no insurance found for patient  Arthur Duribn was unable to return call to patient he only speaks Danish  I placed call to patient no response left message for both him and his sister Samuel Malvin  I received a call back from patients sister Samueldelores Coombs who stated patient has been working with Delmar Ratliff Counselor @ [address]  Samuel Coombs states the application was submitted however they received a letter for additional information on which they completed and mailed out Monday 5-31-21  Samuel Coombs stated per Financial Counselor once all information is received application will continue processing  Call placed to Davee Schaumann [phone] who confirmed he is working with the patient to apply for Medicaid  Letter for additional information was mailed to the patient on 5-19-21 along with a zero income worksheet should the patient not have proof of wages  Advised Davee Schaumann that I had spoken to the patient and documents were mailed out Monday  Epic has been noted

## 2021-06-04 NOTE — PROGRESS NOTES
recvd email from Peter about pt having ma checked promise & there is no listing for pt  Emailed Peter & the team  Called pt to find out about when he applied for ma but he only speaks Dominican  Asked him if there is anybody there that speaks [phone] all he kept saying is speak Dominican  Daniella Baron
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
## 2021-06-04 NOTE — Clinical Note
Patient is working thru obtained medical assistance, currently uninsured  Perhaps this should be scheduled at [facility]? I would also like MD assist, I discussed the case with Raymundo Sensing  Maybe we can find a time to tag him in?  Thanks

## 2021-06-16 ENCOUNTER — TELEPHONE (OUTPATIENT)
Dept: UROLOGY | Facility: MEDICAL CENTER | Age: 61
End: 2021-06-16
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
## 2021-06-16 NOTE — TELEPHONE ENCOUNTER
I called patients shilpi hickman and scheduled his surgery for 9/1/21 at [address] with Dr Gayatri Landry  Patient will have PATS prior to surgery  He will have blood work, urine culture and EKG  I am mailing patient a surgery packet  Ilya Bernal said that once he gets his insurance she will call me back with that information  I did tell her that if I dont have any insurance information they will need to call  prior to the surgery

## 2021-06-17 ENCOUNTER — OFFICE VISIT (OUTPATIENT)
Dept: GASTROENTEROLOGY | Facility: MEDICAL CENTER | Age: 61
End: 2021-06-17

## 2021-06-17 VITALS
DIASTOLIC BLOOD PRESSURE: 90 MMHG | SYSTOLIC BLOOD PRESSURE: 140 MMHG | BODY MASS INDEX: 29.49 KG/M2 | WEIGHT: 206 LBS | TEMPERATURE: 98 F | HEIGHT: 70 IN | HEART RATE: 84 BPM

## 2021-06-17 DIAGNOSIS — Z12.11 SCREENING FOR COLON CANCER: Primary | ICD-10-CM

## 2021-06-17 PROCEDURE — 99203 OFFICE O/P NEW LOW 30 MIN: CPT | Performed by: INTERNAL MEDICINE
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
## 2021-06-17 NOTE — PATIENT INSTRUCTIONS
The patient is scheduled at [facility] for a colon with Dr Kathryn Lowery on 8/16/2021  Miralax/dulcolax prep instructions have been gone over in the office, with the patient, by the MA  The patient is aware that they will receive a call with the arrival time the day prior to procedure and that they will need a  the day of the procedure   I have asked the patient to call with any questions that they might have prior to procedure

## 2021-07-07 NOTE — TELEPHONE ENCOUNTER
Lio Reyna called stating she is returning the call to Surgical scheduler Norleen Hodgkin       She can be reached at [phone]

## 2021-08-13 ENCOUNTER — TELEPHONE (OUTPATIENT)
Dept: GASTROENTEROLOGY | Facility: MEDICAL CENTER | Age: 61
End: 2021-08-13

## 2021-08-13 RX ORDER — SODIUM CHLORIDE 9 MG/ML
125 INJECTION, SOLUTION INTRAVENOUS CONTINUOUS
Status: CANCELLED | OUTPATIENT
Start: 2021-08-13

## 2021-08-16 ENCOUNTER — HOSPITAL ENCOUNTER (OUTPATIENT)
Dept: GASTROENTEROLOGY | Facility: MEDICAL CENTER | Age: 61
Setting detail: OUTPATIENT SURGERY
Discharge: HOME | End: 2021-08-16
Admitting: INTERNAL MEDICINE

## 2021-08-16 ENCOUNTER — ANESTHESIA (OUTPATIENT)
Dept: GASTROENTEROLOGY | Facility: MEDICAL CENTER | Age: 61
End: 2021-08-16

## 2021-08-16 ENCOUNTER — ANESTHESIA EVENT (OUTPATIENT)
Dept: GASTROENTEROLOGY | Facility: MEDICAL CENTER | Age: 61
End: 2021-08-16

## 2021-08-16 VITALS
DIASTOLIC BLOOD PRESSURE: 77 MMHG | RESPIRATION RATE: 18 BRPM | HEART RATE: 55 BPM | SYSTOLIC BLOOD PRESSURE: 139 MMHG | WEIGHT: 208 LBS | OXYGEN SATURATION: 100 % | HEIGHT: 70 IN | BODY MASS INDEX: 29.78 KG/M2

## 2021-08-16 DIAGNOSIS — Z12.11 SCREENING FOR COLON CANCER: ICD-10-CM

## 2021-08-16 PROCEDURE — 88305 TISSUE EXAM BY PATHOLOGIST: CPT | Performed by: PATHOLOGY

## 2021-08-16 PROCEDURE — 45385 COLONOSCOPY W/LESION REMOVAL: CPT | Performed by: INTERNAL MEDICINE

## 2021-08-16 RX ORDER — PROPOFOL 10 MG/ML
INJECTION, EMULSION INTRAVENOUS AS NEEDED
Status: DISCONTINUED | OUTPATIENT
Start: 2021-08-16 | End: 2021-08-16

## 2021-08-16 RX ORDER — SODIUM CHLORIDE 9 MG/ML
125 INJECTION, SOLUTION INTRAVENOUS CONTINUOUS
Status: DISCONTINUED | OUTPATIENT
Start: 2021-08-16 | End: 2021-08-20 | Stop reason: HOSPADM

## 2021-08-16 RX ADMIN — PROPOFOL 20 MG: 10 INJECTION, EMULSION INTRAVENOUS at 07:48

## 2021-08-16 RX ADMIN — PROPOFOL 20 MG: 10 INJECTION, EMULSION INTRAVENOUS at 07:43

## 2021-08-16 RX ADMIN — PROPOFOL 20 MG: 10 INJECTION, EMULSION INTRAVENOUS at 07:36

## 2021-08-16 RX ADMIN — PROPOFOL 20 MG: 10 INJECTION, EMULSION INTRAVENOUS at 07:46

## 2021-08-16 RX ADMIN — SODIUM CHLORIDE 125 ML/HR: 0.9 INJECTION, SOLUTION INTRAVENOUS at 07:09

## 2021-08-16 RX ADMIN — PROPOFOL 40 MG: 10 INJECTION, EMULSION INTRAVENOUS at 07:39

## 2021-08-16 RX ADMIN — PROPOFOL 100 MG: 10 INJECTION, EMULSION INTRAVENOUS at 07:35

## 2021-08-16 NOTE — DISCHARGE INSTRUCTIONS
Colonoscopy   WHAT YOU NEED TO KNOW:   A colonoscopy is a procedure to examine the inside of your colon (intestine) with a scope  Polyps or tissue growths may have been removed during your colonoscopy  It is normal to feel bloated and to have some abdominal discomfort  You should be passing gas  If you have hemorrhoids or you had polyps removed, you may have a small amount of bleeding  DISCHARGE INSTRUCTIONS:   Seek care immediately if:    You have sudden, severe abdominal pain   You have problems swallowing   You have a large amount of black, sticky bowel movements or blood in your bowel movements   You have sudden trouble breathing   You feel weak, lightheaded, or faint or your heart beats faster than normal for you  Contact your healthcare provider if:    You have a fever and chills   You have nausea or are vomiting   Your abdomen is bloated or feels full and hard   You have abdominal pain   You have black, sticky bowel movements or blood in your bowel movements   You have not had a bowel movement for 3 days after your procedure   You have rash or hives   You have questions or concerns about your procedure  Activity:    Do not lift, strain, or run for 24 hours after your procedure   Rest after your procedure  You have been given medicine to relax you  Do not drive or make important decisions until the day after your procedure  Return to your normal activity as directed   Relieve gas and discomfort from bloating by lying on your right side with a heating pad on your abdomen  You may need to take short walks to help the gas move out  Eat small meals until bloating is relieved  Follow up with your healthcare provider as directed: Write down your questions so you remember to ask them during your visits  If you take a blood thinner, please review the specific instructions from your endoscopist about when you should resume it   These can be found in the Recommendation and Your Medication list sections of this After Visit Summary  Pólipos colorrectales   LO QUE NECESITA SABER:   Los pólipos colorrectales son pequeñas protuberancias de tejido que se encuentran en el forro del colon y recto  Jerri Clam de los pólipos son hiperplásticos y típicamente son benignos (no cancerosos)  Ciertos tipos de pólipos llamados adenomatosos, podrían convertirse en cáncer  INSTRUCCIONES SOBRE EL JOSE HOSPITALARIA:   Erma himanshu amee de seguimiento con amos médico o gastroenterólogo heike le indiquen: Es probable que usted tenga que regresar para hacerse pruebas adicionales heike otra colonoscopia  Anote ishmael preguntas para que se acuerde de hacerlas maday ishmael visitas  Reduzca amos riesgo de pólipos colorrectales:  · Consuma alimentos saludables y variados: Los alimentos saludables incluyen fruta, vegetales, panes integrales, productos lácteos bajo en grasa, frijoles, conor sin grasa, y pescado  Pregunte si necesita seguir himanshu dieta especial     · Mantenga un peso saludable: Pregunte al médico si necesita perder peso y cuánto  Pida ayuda con un programa para bajar de [place]  · Ejercicio: Raysa Edmond lentamente y erma más a medida que se sienta más natalia  Consulte con amos médico antes de empezar un régimen de ejercicios  · Limite el consumo de alcohol Amos riesgo de tener pólipos aumenta cuanto más se alec  · No fume: Si usted fuma, nunca es demasiado tarde para dejar de hacerlo  Pida información para dejar de fumar  Para [de-identified] y más información:  · Chantelle Yepez ([facility])  [address] [place] [place], [place] [address]  Phone: [phone]  Web Address: Mariia rahman nih gov    Comuníquese con amos médico o gastroenterólogo si:  · Tiene fiebre  · Usted tiene escalofríos, tos o se siente débil y adolorido  · Usted tiene dolor abdominal que no desaparece o aumenta después de antonieta amos medicamento      · Amos abdomen se encuentra inflamado  · Usted pierde peso sin proponérselo  · Usted tiene preguntas o inquietudes acerca de banks condición o cuidado  Busque atención médica de inmediato o llame al 911 si:  · Usted tiene falta de aire repentina  · Tiene el ritmo cardíaco acelerado, la respiración acelerada o está demasiado mareado o débil para pararse  · Usted tiene dolor abdominal intenso  · Usted ve tamara en ishmael deposiciones  © [employer] Information is for End User's use only and may not be sold, redistributed or otherwise used for commercial purposes  All illustrations and images included in CareNotes® are the copyrighted property of [employer] A [employer]  or [address] [place] [address] es sólo para uso en educación  Banks intención no es darle un consejo médico sobre enfermedades o tratamientos  Colsulte con banks Charlie Ahle farmacéutico antes de seguir cualquier régimen médico para saber si es seguro y efectivo para usted  Diverticulosis   LO QUE NECESITA SABER:   ¿Qué es la diverticulosis? La diverticulosis es himanshu condición que ocasiona que se formen pequeñas bolsas llamadas divertícula en el intestino  Estas bolsas dificultan que las evacuaciones intestinales pasen a través del [address]  ¿Qué provoca la diverticulosis? La divertícula se forma cuando los músculos tienen que trabajar más para  las evacuaciones intestinales a través del intestino  La fuerza provoca que se formen protuberancias en las áreas débiles del intestino  [place] puede suceder si come alimentos bajos en fibra  La fibra ayuda a darle más volumen a las evacuaciones intestinales para que logan más grandes y fáciles de  a través del colon  Lo siguiente podría aumentar el riesgo de diverticulosis:  · Un historial de estreñimiento    · 36 años de edad o mayor    · Obesidad    · Falta de ejercicio    ¿Cuáles son los signos y síntomas de la diverticulosis?  Generalmente la diverticulosis no causa ningún signo o síntoma  Puede causar cualquiera de los siguientes en algunas personas:  · Dolor o incomodidad en la parte inferior del abdomen    · Abdomen inflamado    · Estreñimiento o diarrea    ¿Cómo se diagnostica la diverticulosis? Banks médico lo examinará y le preguntará sobre banks evacuación intestinal, Ardena Hails y síntomas  También le preguntará acerca de cualquier afección que tenga y de los medicamentos que yfn  Es posible que usted necesite alguno de los siguientes:  · Los análisis de tamara podrían realizarse para buscar si hay signos de inflamación  · Un enema opaco es himanshu radiografía del colon que puede mostrar divertículos  Se coloca un tubo en banks ano y se pone un líquido llamado bario por el tubo  El bario es para que ishmael médicos puedan dontae banks colon más claramente  · Sigmoidoscopia flexible es un examen para buscar cualquier cambio en el intestino grueso y el recto inferior  Eso podría mostrar la causa del sangrado y del dolor  Se le introducirá en el ano himanshu sonda con Catha [place] shane al final  Catherine Blower se moverá hacia adelante en banks intestino  · Himanshu colonoscopia se utiliza para buscar en todo el colon  Himanshu sonda (tubo raina y flexible con himanshu shane en banks extremidad) se [place] para antonieta imágenes  Esta prueba puede mostrar divertículos  · Himanshu tomografía computarizada (TC) , o tomografía axial computarizada, pueden mostrar divertículos  Es posible que le administren un líquido de contraste antes del examen  Dígale al médico si usted alguna vez ha tenido himanshu reacción alérgica al líquido de [place]  ¿Cómo se trata la diverticulosis? El objetivo del tratamiento es controlar los síntomas que tiene y evitar otros problemas heike la diverticulitis  La diverticulitis es la inflamación o infección de los divertículos  Banks médico podría recomendarle cualquiera de los siguientes:  · Consuma himanshu variedad de alimentos ricos en fibra  Los alimentos altos en [employer] ayudan a regular los movimientos intestinales  Los alimentos con alto contenido de fibra [employer] [employer] frijoles cocidos, las frutas, verduras y algunos cereales  La mayoría de los adultos necesitan de 25 a 35 gramos de fibra por día  Banks médico le puede recomendar que [place]  Pregunte a banks médico cuánta fibra debería consumir  Aumente la fibra lentamente  Usted podría presentar malestar o inflamación estomacal y gases si añade fibra a banks dieta demasiado rápido  Usted podría necesitar antonieta un suplemento de fibra si no está recibiendo suficiente de los alimentos  · Los medicamentos puede administrarse para facilitar la evacuación intestinal  Puede que también necesite medicamentos para tratar síntomas tales Cloteal Daring y dolor  · [address] se le haya indicado  Es posible que usted necesite beber de 2 a 3 litros (8 a 12 vasos) de líquido cada día  Pregunte a banks médico sobre la cantidad de líquido que necesita antonieta todos los días y cuáles le recomienda  · Aplique calor sobre el abdomen de 20 a 30 minutos cada 2 horas por los [employer] indiquen  El calor ayuda a disminuir el dolor y los espasmos musculares  ¿Cómo puedo prevenir la diverticulitis u otros síntomas? Lo siguiente puede ayudar a disminuir el riesgo de diverticulitis o síntomas, heike hemorragias  Hable con banks proveedor acerca de estos u otras cosas que puede hacer para prevenir los problemas que pueden ocurrir con diverticulosis  · Realice actividad física con regularidad  Pregunte a banks médico acerca del mejor plan de ejercicio para usted  El ejercicio puede ayudarlo a tener evacuaciones intestinales regulares  [place] 30 minutos de ejercicio la mayoría de los días de [place]  · Mantenga un peso saludable  Consulte con banks médico cuánto debería pesar  [place] que lo ayude a crear un plan para bajar de peso si tiene sobrepeso  · No fume  La nicotina y otros químicos en los cigarrillos incrementan el riesgo de diverticulitis   Pida información a banks médico si usted actualmente fuma y necesita ayuda para dejar de fumar  Los cigarrillos electrónicos o el tabaco sin humo igualmente contienen nicotina  Consulte con lopes médico antes de [employer]  · Pregunte a lopes médico si es seguro antonieta AINES  AINES puede aumentar lopes riesgo de diverticulitis  ¿Cuándo kaveh buscar atención inmediata? · Siente dolor luis en el lado laura de la parte inferior del abdomen  · Yes[place] deposiciones son de color [place] oscuro o brillante  ¿Cuándo kaveh comunicarme con mi médico?  · Usted tiene fiebre y escalofríos  · Usted se siente mareado o aturdido  · Usted tiene náuseas o está vomitando  · Usted nota un cambio en yesenia evacuaciones intestinales  · Usted tiene preguntas o inquietudes acerca de lopes condición o cuidado  ACUERDOS SOBRE LOPES CUIDADO:   Usted tiene el derecho de ayudar a planear lopes cuidado  Aprenda todo lo que pueda sobre lopes condición y heike darle tratamiento  Discuta yesenia opciones de tratamiento con yesenia médicos para decidir el cuidado que usted desea recibir  Usted siempre tiene el derecho de rechazar el tratamiento  Esta información es sólo para uso en educación  Lopes intención no es darle un consejo médico sobre enfermedades o tratamientos  Colsulte con lopes Nate Wynn farmacéutico antes de seguir cualquier régimen médico para saber si es seguro y efectivo para usted  © Copyright [employer] 2021 Information is for End User's use only and may not be sold, redistributed or otherwise used for commercial purposes   All illustrations and images included in CareNotes® are the copyrighted property of A D A M , Inc  or [address] [place], [place] [address]pe

## 2021-08-16 NOTE — ANESTHESIA PREPROCEDURE EVALUATION
Procedure:  COLONOSCOPY    Relevant Problems   ANESTHESIA (within normal limits)      CARDIO (within normal limits)      ENDO (within normal limits)      GI/HEPATIC (within normal limits)      /RENAL   (+) Prostate cancer (HCC)      HEMATOLOGY   (+) Thrombocytopenia (HCC)      PULMONARY (within normal limits)        Physical Exam    Airway    Mallampati score: II  TM Distance: >3 FB  Neck ROM: full     Dental   No notable dental hx     Cardiovascular  Rhythm: regular, Rate: normal, Cardiovascular exam normal    Pulmonary  Pulmonary exam normal Breath sounds clear to auscultation,     Other Findings        Anesthesia Plan  ASA Score- 2     Anesthesia Type- IV sedation with anesthesia with ASA Monitors  Additional Monitors:   Airway Plan:           Plan Factors-    Chart reviewed  Patient summary reviewed  Patient is not a current smoker  Induction- intravenous  Postoperative Plan-     Informed Consent- Anesthetic plan and risks discussed with patient

## 2021-08-21 NOTE — RESULT ENCOUNTER NOTE
My medical assistant will call him with his results  None of his polyps were adenomas so he can repeat colonoscopy in 10 years

## 2021-08-24 ENCOUNTER — OFFICE VISIT (OUTPATIENT)
Dept: UROLOGY | Facility: CLINIC | Age: 61
End: 2021-08-24

## 2021-08-24 VITALS
BODY MASS INDEX: 29.35 KG/M2 | SYSTOLIC BLOOD PRESSURE: 122 MMHG | HEIGHT: 70 IN | WEIGHT: 205 LBS | DIASTOLIC BLOOD PRESSURE: 72 MMHG

## 2021-08-24 DIAGNOSIS — C61 PROSTATE CANCER (HCC): Primary | ICD-10-CM

## 2021-08-24 PROCEDURE — 99214 OFFICE O/P EST MOD 30 MIN: CPT | Performed by: PHYSICIAN ASSISTANT
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
## 2021-08-25 NOTE — TELEPHONE ENCOUNTER
I spoke to patients niece and moved surgery to the [facility] [place], operated by [facility]  I am e mailing her the directions the [facility]

## 2021-08-25 NOTE — TELEPHONE ENCOUNTER
I left message for patient and his niece Jodie Babinski to call me back regarding his surgery on 9/22

## 2021-09-02 ENCOUNTER — EVALUATION (OUTPATIENT)
Dept: PHYSICAL THERAPY | Facility: REHABILITATION | Age: 61
End: 2021-09-02
Payer: COMMERCIAL

## 2021-09-02 DIAGNOSIS — M62.89 PELVIC FLOOR DYSFUNCTION: Primary | ICD-10-CM

## 2021-09-02 DIAGNOSIS — C61 PROSTATE CANCER (HCC): ICD-10-CM

## 2021-09-02 PROCEDURE — 97161 PT EVAL LOW COMPLEX 20 MIN: CPT | Performed by: PHYSICAL THERAPIST

## 2021-09-02 NOTE — PROGRESS NOTES
PT Evaluation     Today's date: 2021  Patient name: Devin Avila  : 1960  MRN: [id number]  Referring provider: Jai Cervantes  Dx:   Encounter Diagnosis     ICD-10-CM    1  Pelvic floor dysfunction  M62 89    2  Prostate cancer [facility])  C61 Ambulatory referral to Physical Therapy                  Assessment  Assessment details: Devin Avila is a 64 y o  male who presents to physical therapy with Pelvic floor dysfunction  (primary encounter diagnosis) and Prostate cancer ([facility] )  Deferred internal secondary to language barrier  Pt's daughter was present throughout visit and provided translation  Pt was instructed in post-op guidelines, post-micturition dribble, bowel mechanics, pelvic floor exercises, and transfers  Pt is aware of healing process and to contact PT at 4 weeks post-op if he is not progressing toward continence  Pt was given written copy of all education and will contact office as needed after surgery  Thank you for this referral      Impairments: abnormal muscle firing and lacks appropriate home exercise program  Barriers to therapy: Niuean-speaking only  Understanding of Dx/Px/POC: good   Prognosis: good    Goals  Short term = long term (by discharge)  1  Pt will be compliant with HEP  2  Pt will display good form of contract, relax, bulge for restoration of continence and bowel mechanics post-op  3  Pt will verbalize understanding of all provided education      Plan  Plan details: Pt may return as needed following prostatectomy  Patient would benefit from: PT eval and skilled physical therapy  Planned therapy interventions: abdominal trunk stabilization, activity modification, neuromuscular re-education, patient education, postural training, behavior modification, body mechanics training, breathing training, strengthening, stretching, therapeutic activities, therapeutic exercise and home exercise program  Frequency: 1x week  Duration in visits: 3  Treatment plan discussed with: patient and family        PT Pelvic Floor Subjective:   History of Present Illness:   Pt was unaware he was having issues with prostate, just noticed that he had to pee a lot  Pt's surgery is scheduled 9/22/21  Pt denies pain  Pt has no history of low back or hip pain  Social Support:     Lives in:  Multiple-level home (will stay on second floor to be close to bathroom)    Lives with: niece, will stay with sig other after surgery  Relationship status: never     Work status: unemployed    History of Depression: yes    feels sad due to current situation  Diet and Exercise:    Diet:balanced nutrition    Exercise type: walking  Bladder Function:     Voiding Difficulties positive for: urgency (sometimes), straining and incomplete emptying (sometimes)      Voiding Difficulties negative for: frequent urination and hesitancy      Voiding Difficulties comments:     Voiding frequency: every 1-2 hours    Urinary leakage: no urine leakage    Nocturia (episodes per night): 3    Painful urination: No      Fluid Intake Type: Water  Bowel Function:     Bowel frequency: multiple times a day    Teller Stool Scale: type 4  Sexual Function:     Sexually Active:  Not sexually active  Pain:     Location:  Sometimes the bottom of the foot  Diagnostic Tests:     CT scan: abnormal    Patient Goals: Other patient goals:  Learn exercises, information for after surgery      Objective   Pelvic Floor Exam     General Perineum Exam:     General perineum exam comments: Assessed with pt sitting on 2 towels and palpation at EAS: pt has good understanding of contract, relax, bulge but has difficulty with holding contraction for greater than 2 seconds  Discussed with pt and daughter importance of exercises both prior to surgery and after catheter removal to help with continence                 Precautions: prostate cancer, hx of abdominal surgery      Manuals 9/2                                                                Neuro Re-Ed 9/2            PF slow holds 5W/10R, 5x            PF quick flicks 8U/8I, 5x            TA + PF  3"x5                                                                Ther Ex                                                                                                                     Ther Activity 9/2            The veda reviewed            Post-op guidelines reviewed            Post-micturition dribble reviewed            Bowel mechanics 8 min            Sit to stand 3x            Supine to sit 2x            Gait Training                                       Modalities

## 2021-09-09 ENCOUNTER — HOSPITAL ENCOUNTER (OUTPATIENT)
Dept: NON INVASIVE DIAGNOSTICS | Facility: HOSPITAL | Age: 61
Discharge: HOME | End: 2021-09-09
Attending: UROLOGY
Payer: COMMERCIAL

## 2021-09-09 ENCOUNTER — APPOINTMENT (OUTPATIENT)
Dept: LAB | Facility: HOSPITAL | Age: 61
End: 2021-09-09
Attending: UROLOGY
Payer: COMMERCIAL

## 2021-09-09 DIAGNOSIS — C61 MALIGNANT NEOPLASM OF PROSTATE (HCC): ICD-10-CM

## 2021-09-09 DIAGNOSIS — C61 PROSTATE CANCER (HCC): ICD-10-CM

## 2021-09-09 LAB
ABO GROUP BLD: NORMAL
ALBUMIN SERPL BCP-MCNC: 3.6 G/DL (ref 3.5–5)
ALP SERPL-CCNC: 113 U/L (ref 46–116)
ALT SERPL W P-5'-P-CCNC: 43 U/L (ref 12–78)
ANION GAP SERPL CALCULATED.3IONS-SCNC: 8 MMOL/L (ref 4–13)
APTT PPP: 26 SECONDS (ref 23–37)
AST SERPL W P-5'-P-CCNC: 23 U/L (ref 5–45)
BASOPHILS # BLD AUTO: 0.02 THOUSANDS/ΜL (ref 0–0.1)
BASOPHILS NFR BLD AUTO: 1 % (ref 0–1)
BILIRUB SERPL-MCNC: 1.07 MG/DL (ref 0.2–1)
BLD GP AB SCN SERPL QL: NEGATIVE
BUN SERPL-MCNC: 11 MG/DL (ref 5–25)
CALCIUM SERPL-MCNC: 8.4 MG/DL (ref 8.3–10.1)
CHLORIDE SERPL-SCNC: 105 MMOL/L (ref 100–108)
CO2 SERPL-SCNC: 26 MMOL/L (ref 21–32)
CREAT SERPL-MCNC: 1.05 MG/DL (ref 0.6–1.3)
EOSINOPHIL # BLD AUTO: 0.07 THOUSAND/ΜL (ref 0–0.61)
EOSINOPHIL NFR BLD AUTO: 2 % (ref 0–6)
ERYTHROCYTE [DISTWIDTH] IN BLOOD BY AUTOMATED COUNT: 13.7 % (ref 11.6–15.1)
GFR SERPL CREATININE-BSD FRML MDRD: 76 ML/MIN/1.73SQ M
GLUCOSE P FAST SERPL-MCNC: 117 MG/DL (ref 65–99)
HCT VFR BLD AUTO: 48.3 % (ref 36.5–49.3)
HGB BLD-MCNC: 15.9 G/DL (ref 12–17)
IMM GRANULOCYTES # BLD AUTO: 0.03 THOUSAND/UL (ref 0–0.2)
IMM GRANULOCYTES NFR BLD AUTO: 1 % (ref 0–2)
INR PPP: 1.04 (ref 0.84–1.19)
LYMPHOCYTES # BLD AUTO: 1.78 THOUSANDS/ΜL (ref 0.6–4.47)
LYMPHOCYTES NFR BLD AUTO: 48 % (ref 14–44)
MCH RBC QN AUTO: 28.7 PG (ref 26.8–34.3)
MCHC RBC AUTO-ENTMCNC: 32.9 G/DL (ref 31.4–37.4)
MCV RBC AUTO: 87 FL (ref 82–98)
MONOCYTES # BLD AUTO: 0.53 THOUSAND/ΜL (ref 0.17–1.22)
MONOCYTES NFR BLD AUTO: 14 % (ref 4–12)
NEUTROPHILS # BLD AUTO: 1.27 THOUSANDS/ΜL (ref 1.85–7.62)
NEUTS SEG NFR BLD AUTO: 34 % (ref 43–75)
NRBC BLD AUTO-RTO: 0 /100 WBCS
PLATELET # BLD AUTO: 132 THOUSANDS/UL (ref 149–390)
PMV BLD AUTO: 11 FL (ref 8.9–12.7)
POTASSIUM SERPL-SCNC: 4.4 MMOL/L (ref 3.5–5.3)
PROT SERPL-MCNC: 7.8 G/DL (ref 6.4–8.2)
PROTHROMBIN TIME: 13.4 SECONDS (ref 11.6–14.5)
RBC # BLD AUTO: 5.54 MILLION/UL (ref 3.88–5.62)
RH BLD: NEGATIVE
SODIUM SERPL-SCNC: 139 MMOL/L (ref 136–145)
SPECIMEN EXPIRATION DATE: NORMAL
WBC # BLD AUTO: 3.7 THOUSAND/UL (ref 4.31–10.16)

## 2021-09-09 PROCEDURE — 86900 BLOOD TYPING SEROLOGIC ABO: CPT

## 2021-09-09 PROCEDURE — 87086 URINE CULTURE/COLONY COUNT: CPT

## 2021-09-09 PROCEDURE — 86850 RBC ANTIBODY SCREEN: CPT

## 2021-09-09 PROCEDURE — 93005 ELECTROCARDIOGRAM TRACING: CPT

## 2021-09-09 PROCEDURE — 85610 PROTHROMBIN TIME: CPT

## 2021-09-09 PROCEDURE — 85025 COMPLETE CBC W/AUTO DIFF WBC: CPT

## 2021-09-09 PROCEDURE — 80053 COMPREHEN METABOLIC PANEL: CPT

## 2021-09-09 PROCEDURE — 36415 COLL VENOUS BLD VENIPUNCTURE: CPT

## 2021-09-09 PROCEDURE — 86901 BLOOD TYPING SEROLOGIC RH(D): CPT

## 2021-09-09 PROCEDURE — 85730 THROMBOPLASTIN TIME PARTIAL: CPT

## 2021-09-10 LAB
ATRIAL RATE: 59 BPM
P AXIS: 16 DEGREES
PR INTERVAL: 160 MS
QRS AXIS: 34 DEGREES
QRSD INTERVAL: 86 MS
QT INTERVAL: 392 MS
QTC INTERVAL: 388 MS
T WAVE AXIS: 47 DEGREES
VENTRICULAR RATE: 59 BPM

## 2021-09-10 PROCEDURE — 93010 ELECTROCARDIOGRAM REPORT: CPT | Performed by: INTERNAL MEDICINE

## 2021-09-12 LAB — BACTERIA UR CULT: ABNORMAL

## 2021-09-14 NOTE — TELEPHONE ENCOUNTER
Boogie Apodaca called in stating patient will be getting a Covid test em Apodaca stated she left a VM and to disregard

## 2021-09-15 PROCEDURE — U0005 INFEC AGEN DETEC AMPLI PROBE: HCPCS | Performed by: UROLOGY

## 2021-09-15 PROCEDURE — U0003 INFECTIOUS AGENT DETECTION BY NUCLEIC ACID (DNA OR RNA); SEVERE ACUTE RESPIRATORY SYNDROME CORONAVIRUS 2 (SARS-COV-2) (CORONAVIRUS DISEASE [COVID-19]), AMPLIFIED PROBE TECHNIQUE, MAKING USE OF HIGH THROUGHPUT TECHNOLOGIES AS DESCRIBED BY CMS-2020-01-R: HCPCS | Performed by: UROLOGY

## 2021-09-15 NOTE — TELEPHONE ENCOUNTER
I spoke to patients niece, Daleen Dancer that they had to wait in line for his covid testing  The orders are in the computer system and are not

## 2021-09-15 NOTE — TELEPHONE ENCOUNTER
Patient's niece called stating patient is having surgery and needs a covid test and no order is the system  She wants to know if it can be put in so she can take him today  Please call to confirm

## 2021-09-22 ENCOUNTER — HOSPITAL ENCOUNTER (OUTPATIENT)
Facility: HOSPITAL | Age: 61
Setting detail: OUTPATIENT SURGERY
Discharge: HOME | End: 2021-09-23
Attending: UROLOGY | Admitting: UROLOGY
Payer: COMMERCIAL

## 2021-09-22 ENCOUNTER — TELEPHONE (OUTPATIENT)
Dept: UROLOGY | Facility: CLINIC | Age: 61
End: 2021-09-22

## 2021-09-22 ENCOUNTER — ANESTHESIA EVENT (OUTPATIENT)
Dept: PERIOP | Facility: HOSPITAL | Age: 61
End: 2021-09-22
Payer: COMMERCIAL

## 2021-09-22 ENCOUNTER — ANESTHESIA (OUTPATIENT)
Dept: PERIOP | Facility: HOSPITAL | Age: 61
End: 2021-09-22
Payer: COMMERCIAL

## 2021-09-22 DIAGNOSIS — C61 PROSTATE CANCER (HCC): Primary | ICD-10-CM

## 2021-09-22 LAB
GLUCOSE SERPL-MCNC: 96 MG/DL (ref 65–140)
PLATELET # BLD AUTO: 121 THOUSANDS/UL (ref 149–390)
PMV BLD AUTO: 11.2 FL (ref 8.9–12.7)

## 2021-09-22 PROCEDURE — 44180 LAP ENTEROLYSIS: CPT | Performed by: UROLOGY

## 2021-09-22 PROCEDURE — 85049 AUTOMATED PLATELET COUNT: CPT | Performed by: UROLOGY

## 2021-09-22 PROCEDURE — 82948 REAGENT STRIP/BLOOD GLUCOSE: CPT

## 2021-09-22 RX ORDER — DEXAMETHASONE SODIUM PHOSPHATE 10 MG/ML
INJECTION, SOLUTION INTRAMUSCULAR; INTRAVENOUS AS NEEDED
Status: DISCONTINUED | OUTPATIENT
Start: 2021-09-22 | End: 2021-09-22

## 2021-09-22 RX ORDER — MIDAZOLAM HYDROCHLORIDE 2 MG/2ML
INJECTION, SOLUTION INTRAMUSCULAR; INTRAVENOUS AS NEEDED
Status: DISCONTINUED | OUTPATIENT
Start: 2021-09-22 | End: 2021-09-22

## 2021-09-22 RX ORDER — ACETAMINOPHEN 325 MG/1
650 TABLET ORAL EVERY 6 HOURS SCHEDULED
Status: DISCONTINUED | OUTPATIENT
Start: 2021-09-22 | End: 2021-09-23 | Stop reason: HOSPADM

## 2021-09-22 RX ORDER — SIMETHICONE 80 MG
80 TABLET,CHEWABLE ORAL 4 TIMES DAILY PRN
Status: DISCONTINUED | OUTPATIENT
Start: 2021-09-22 | End: 2021-09-23 | Stop reason: HOSPADM

## 2021-09-22 RX ORDER — KETAMINE HCL IN NACL, ISO-OSM 100MG/10ML
SYRINGE (ML) INJECTION AS NEEDED
Status: DISCONTINUED | OUTPATIENT
Start: 2021-09-22 | End: 2021-09-22

## 2021-09-22 RX ORDER — SODIUM CHLORIDE, SODIUM LACTATE, POTASSIUM CHLORIDE, CALCIUM CHLORIDE 600; 310; 30; 20 MG/100ML; MG/100ML; MG/100ML; MG/100ML
125 INJECTION, SOLUTION INTRAVENOUS CONTINUOUS
Status: DISPENSED | OUTPATIENT
Start: 2021-09-22 | End: 2021-09-22

## 2021-09-22 RX ORDER — HYDROMORPHONE HCL/PF 1 MG/ML
0.5 SYRINGE (ML) INJECTION EVERY 2 HOUR PRN
Status: DISCONTINUED | OUTPATIENT
Start: 2021-09-22 | End: 2021-09-23 | Stop reason: HOSPADM

## 2021-09-22 RX ORDER — SENNOSIDES 8.6 MG
1 TABLET ORAL DAILY
Status: DISCONTINUED | OUTPATIENT
Start: 2021-09-23 | End: 2021-09-23 | Stop reason: HOSPADM

## 2021-09-22 RX ORDER — FENTANYL CITRATE/PF 50 MCG/ML
50 SYRINGE (ML) INJECTION
Status: DISCONTINUED | OUTPATIENT
Start: 2021-09-22 | End: 2021-09-22 | Stop reason: HOSPADM

## 2021-09-22 RX ORDER — ONDANSETRON 2 MG/ML
INJECTION INTRAMUSCULAR; INTRAVENOUS AS NEEDED
Status: DISCONTINUED | OUTPATIENT
Start: 2021-09-22 | End: 2021-09-22

## 2021-09-22 RX ORDER — SODIUM CHLORIDE 9 MG/ML
INJECTION, SOLUTION INTRAVENOUS CONTINUOUS PRN
Status: DISCONTINUED | OUTPATIENT
Start: 2021-09-22 | End: 2021-09-22

## 2021-09-22 RX ORDER — ONDANSETRON 2 MG/ML
4 INJECTION INTRAMUSCULAR; INTRAVENOUS ONCE AS NEEDED
Status: DISCONTINUED | OUTPATIENT
Start: 2021-09-22 | End: 2021-09-22 | Stop reason: HOSPADM

## 2021-09-22 RX ORDER — BUPIVACAINE HYDROCHLORIDE 5 MG/ML
INJECTION, SOLUTION PERINEURAL AS NEEDED
Status: DISCONTINUED | OUTPATIENT
Start: 2021-09-22 | End: 2021-09-22 | Stop reason: HOSPADM

## 2021-09-22 RX ORDER — LIDOCAINE HYDROCHLORIDE 10 MG/ML
INJECTION, SOLUTION EPIDURAL; INFILTRATION; INTRACAUDAL; PERINEURAL AS NEEDED
Status: DISCONTINUED | OUTPATIENT
Start: 2021-09-22 | End: 2021-09-22

## 2021-09-22 RX ORDER — SODIUM CHLORIDE, SODIUM LACTATE, POTASSIUM CHLORIDE, CALCIUM CHLORIDE 600; 310; 30; 20 MG/100ML; MG/100ML; MG/100ML; MG/100ML
75 INJECTION, SOLUTION INTRAVENOUS CONTINUOUS
Status: DISCONTINUED | OUTPATIENT
Start: 2021-09-22 | End: 2021-09-23

## 2021-09-22 RX ORDER — MAGNESIUM HYDROXIDE 1200 MG/15ML
LIQUID ORAL AS NEEDED
Status: DISCONTINUED | OUTPATIENT
Start: 2021-09-22 | End: 2021-09-22 | Stop reason: HOSPADM

## 2021-09-22 RX ORDER — ROCURONIUM BROMIDE 10 MG/ML
INJECTION, SOLUTION INTRAVENOUS AS NEEDED
Status: DISCONTINUED | OUTPATIENT
Start: 2021-09-22 | End: 2021-09-22

## 2021-09-22 RX ORDER — HYDROMORPHONE HCL/PF 1 MG/ML
0.5 SYRINGE (ML) INJECTION
Status: DISCONTINUED | OUTPATIENT
Start: 2021-09-22 | End: 2021-09-22 | Stop reason: HOSPADM

## 2021-09-22 RX ORDER — CEFAZOLIN SODIUM 1 G/50ML
1000 SOLUTION INTRAVENOUS EVERY 8 HOURS
Status: COMPLETED | OUTPATIENT
Start: 2021-09-22 | End: 2021-09-23

## 2021-09-22 RX ORDER — EPHEDRINE SULFATE 50 MG/ML
INJECTION INTRAVENOUS AS NEEDED
Status: DISCONTINUED | OUTPATIENT
Start: 2021-09-22 | End: 2021-09-22

## 2021-09-22 RX ORDER — KETOROLAC TROMETHAMINE 30 MG/ML
15 INJECTION, SOLUTION INTRAMUSCULAR; INTRAVENOUS EVERY 6 HOURS SCHEDULED
Status: DISCONTINUED | OUTPATIENT
Start: 2021-09-22 | End: 2021-09-23 | Stop reason: HOSPADM

## 2021-09-22 RX ORDER — NEOSTIGMINE METHYLSULFATE 1 MG/ML
INJECTION INTRAVENOUS AS NEEDED
Status: DISCONTINUED | OUTPATIENT
Start: 2021-09-22 | End: 2021-09-22

## 2021-09-22 RX ORDER — ATROPA BELLADONNA AND OPIUM 16.2; 6 MG/1; MG/1
SUPPOSITORY RECTAL AS NEEDED
Status: DISCONTINUED | OUTPATIENT
Start: 2021-09-22 | End: 2021-09-22 | Stop reason: HOSPADM

## 2021-09-22 RX ORDER — TAMSULOSIN HYDROCHLORIDE 0.4 MG/1
0.4 CAPSULE ORAL
Status: DISCONTINUED | OUTPATIENT
Start: 2021-09-22 | End: 2021-09-23 | Stop reason: HOSPADM

## 2021-09-22 RX ORDER — OXYCODONE HYDROCHLORIDE 5 MG/1
5 TABLET ORAL EVERY 4 HOURS PRN
Status: DISCONTINUED | OUTPATIENT
Start: 2021-09-22 | End: 2021-09-23 | Stop reason: HOSPADM

## 2021-09-22 RX ORDER — PROPOFOL 10 MG/ML
INJECTION, EMULSION INTRAVENOUS AS NEEDED
Status: DISCONTINUED | OUTPATIENT
Start: 2021-09-22 | End: 2021-09-22

## 2021-09-22 RX ORDER — CEFAZOLIN SODIUM 2 G/50ML
2000 SOLUTION INTRAVENOUS ONCE
Status: COMPLETED | OUTPATIENT
Start: 2021-09-22 | End: 2021-09-22

## 2021-09-22 RX ORDER — ONDANSETRON 2 MG/ML
4 INJECTION INTRAMUSCULAR; INTRAVENOUS EVERY 6 HOURS PRN
Status: DISCONTINUED | OUTPATIENT
Start: 2021-09-22 | End: 2021-09-23 | Stop reason: HOSPADM

## 2021-09-22 RX ORDER — FENTANYL CITRATE 50 UG/ML
INJECTION, SOLUTION INTRAMUSCULAR; INTRAVENOUS AS NEEDED
Status: DISCONTINUED | OUTPATIENT
Start: 2021-09-22 | End: 2021-09-22

## 2021-09-22 RX ORDER — GLYCOPYRROLATE 0.2 MG/ML
INJECTION INTRAMUSCULAR; INTRAVENOUS AS NEEDED
Status: DISCONTINUED | OUTPATIENT
Start: 2021-09-22 | End: 2021-09-22

## 2021-09-22 RX ADMIN — HYDROMORPHONE HYDROCHLORIDE 0.5 MG: 1 INJECTION, SOLUTION INTRAMUSCULAR; INTRAVENOUS; SUBCUTANEOUS at 11:18

## 2021-09-22 RX ADMIN — PROPOFOL 200 MG: 10 INJECTION, EMULSION INTRAVENOUS at 07:41

## 2021-09-22 RX ADMIN — EPHEDRINE SULFATE 5 MG: 50 INJECTION, SOLUTION INTRAVENOUS at 07:55

## 2021-09-22 RX ADMIN — HYDROMORPHONE HYDROCHLORIDE 0.5 MG: 1 INJECTION, SOLUTION INTRAMUSCULAR; INTRAVENOUS; SUBCUTANEOUS at 14:33

## 2021-09-22 RX ADMIN — FENTANYL CITRATE 25 MCG: 50 INJECTION, SOLUTION INTRAMUSCULAR; INTRAVENOUS at 08:50

## 2021-09-22 RX ADMIN — CEFAZOLIN SODIUM 1000 MG: 1 SOLUTION INTRAVENOUS at 15:53

## 2021-09-22 RX ADMIN — KETOROLAC TROMETHAMINE 15 MG: 30 INJECTION, SOLUTION INTRAMUSCULAR; INTRAVENOUS at 19:47

## 2021-09-22 RX ADMIN — FENTANYL CITRATE 100 MCG: 50 INJECTION, SOLUTION INTRAMUSCULAR; INTRAVENOUS at 07:41

## 2021-09-22 RX ADMIN — LIDOCAINE HYDROCHLORIDE 50 MG: 10 INJECTION, SOLUTION EPIDURAL; INFILTRATION; INTRACAUDAL at 07:41

## 2021-09-22 RX ADMIN — FENTANYL CITRATE 25 MCG: 50 INJECTION, SOLUTION INTRAMUSCULAR; INTRAVENOUS at 09:25

## 2021-09-22 RX ADMIN — FENTANYL CITRATE 25 MCG: 50 INJECTION, SOLUTION INTRAMUSCULAR; INTRAVENOUS at 09:35

## 2021-09-22 RX ADMIN — SODIUM CHLORIDE, SODIUM LACTATE, POTASSIUM CHLORIDE, AND CALCIUM CHLORIDE 125 ML/HR: .6; .31; .03; .02 INJECTION, SOLUTION INTRAVENOUS at 17:59

## 2021-09-22 RX ADMIN — ROCURONIUM BROMIDE 50 MG: 10 SOLUTION INTRAVENOUS at 07:41

## 2021-09-22 RX ADMIN — Medication 30 MG: at 08:20

## 2021-09-22 RX ADMIN — FENTANYL CITRATE 25 MCG: 50 INJECTION, SOLUTION INTRAMUSCULAR; INTRAVENOUS at 09:30

## 2021-09-22 RX ADMIN — EPHEDRINE SULFATE 5 MG: 50 INJECTION, SOLUTION INTRAVENOUS at 07:53

## 2021-09-22 RX ADMIN — CEFAZOLIN SODIUM 2000 MG: 2 SOLUTION INTRAVENOUS at 07:45

## 2021-09-22 RX ADMIN — MIDAZOLAM HYDROCHLORIDE 2 MG: 1 INJECTION, SOLUTION INTRAMUSCULAR; INTRAVENOUS at 07:33

## 2021-09-22 RX ADMIN — FENTANYL CITRATE 50 MCG: 50 INJECTION INTRAMUSCULAR; INTRAVENOUS at 09:59

## 2021-09-22 RX ADMIN — FENTANYL CITRATE 50 MCG: 50 INJECTION INTRAMUSCULAR; INTRAVENOUS at 10:13

## 2021-09-22 RX ADMIN — NEOSTIGMINE METHYLSULFATE 5 MG: 1 INJECTION INTRAVENOUS at 09:24

## 2021-09-22 RX ADMIN — SODIUM CHLORIDE, SODIUM LACTATE, POTASSIUM CHLORIDE, AND CALCIUM CHLORIDE: .6; .31; .03; .02 INJECTION, SOLUTION INTRAVENOUS at 07:33

## 2021-09-22 RX ADMIN — ACETAMINOPHEN 650 MG: 325 TABLET, FILM COATED ORAL at 17:59

## 2021-09-22 RX ADMIN — ONDANSETRON 4 MG: 2 INJECTION INTRAMUSCULAR; INTRAVENOUS at 09:24

## 2021-09-22 RX ADMIN — GLYCOPYRROLATE 0.8 MG: 0.2 INJECTION, SOLUTION INTRAMUSCULAR; INTRAVENOUS at 09:24

## 2021-09-22 RX ADMIN — TAMSULOSIN HYDROCHLORIDE 0.4 MG: 0.4 CAPSULE ORAL at 21:06

## 2021-09-22 RX ADMIN — OXYCODONE HYDROCHLORIDE 5 MG: 5 TABLET ORAL at 12:14

## 2021-09-22 RX ADMIN — DEXAMETHASONE SODIUM PHOSPHATE 4 MG: 10 INJECTION, SOLUTION INTRAMUSCULAR; INTRAVENOUS at 07:45

## 2021-09-22 RX ADMIN — GLYCOPYRROLATE 0.2 MG: 0.2 INJECTION, SOLUTION INTRAMUSCULAR; INTRAVENOUS at 07:45

## 2021-09-22 RX ADMIN — SODIUM CHLORIDE: 0.9 INJECTION, SOLUTION INTRAVENOUS at 08:07

## 2021-09-22 RX ADMIN — KETOROLAC TROMETHAMINE 15 MG: 30 INJECTION, SOLUTION INTRAMUSCULAR; INTRAVENOUS at 13:39

## 2021-09-22 NOTE — TELEPHONE ENCOUNTER
Previous note from [facility]  in [place] encounter 6/3/21:    Per Dr Rach Arias, patient to receive ADT, SpaceOar, and markers  He is leaning towards RT over surgery, but will make final decision at Dr Malena Spears appt tomorrow  Urology will arrange appointment for Lupron  Does Dr Rach Arias want to see Reji Christensen back prior to scheduling SpaceOAR and fiducial markers?

## 2021-09-22 NOTE — ANESTHESIA POSTPROCEDURE EVALUATION
Post-Op Assessment Note    CV Status:  Stable  Pain Score: 0    Pain management: adequate     Mental Status:  Alert and awake   Hydration Status:  Euvolemic   PONV Controlled:  Controlled   Airway Patency:  Patent and adequate      Post Op Vitals Reviewed: Yes      Staff: CRNA         No complications documented      BP   141/76   Temp  97 5   Pulse  76   Resp 12   SpO2 96

## 2021-09-22 NOTE — ANESTHESIA PREPROCEDURE EVALUATION
Procedure:  PROSTATECTOMY RADICAL AND PELVIC LYMPH NODE DISSECTION LAPAROSCOPIC W/ ROBOTICS (N/A Abdomen)    Relevant Problems   /RENAL   (+) Prostate cancer ([address] )      HEMATOLOGY   (+) Thrombocytopenia (HCC)      Other   (+) Benign prostatic hyperplasia with urinary frequency   (+) Elevated PSA        Physical Exam    Airway    Mallampati score: II  TM Distance: >3 FB  Neck ROM: full     Dental   No notable dental hx     Cardiovascular  Rhythm: regular, Rate: normal,     Pulmonary  Breath sounds clear to auscultation,     Other Findings        Anesthesia Plan  ASA Score- 3     Anesthesia Type- general with ASA Monitors  Additional Monitors:   Airway Plan: ETT  Plan Factors-Exercise tolerance (METS): >4 METS  Chart reviewed  EKG reviewed  Imaging results reviewed  Existing labs reviewed  Patient summary reviewed  Patient is not a current smoker  Induction- intravenous  Postoperative Plan-     Informed Consent- Anesthetic plan and risks discussed with patient (lucio)  I personally reviewed this patient with the CRNA  Discussed and agreed on the Anesthesia Plan with the CRNA             Lab Results   Component Value Date    WBC 3 70 (L) 09/09/2021    HGB 15 9 09/09/2021    HCT 48 3 09/09/2021    MCV 87 09/09/2021     (L) 09/09/2021     Lab Results   Component Value Date    CALCIUM 8 4 09/09/2021    K 4 4 09/09/2021    CO2 26 09/09/2021     09/09/2021    BUN 11 09/09/2021    CREATININE 1 05 09/09/2021     Lab Results   Component Value Date    INR 1 04 09/09/2021    PROTIME 13 4 09/09/2021     Lab Results   Component Value Date    PTT 26 09/09/2021       Type and Screen:  O  No results found for this or any previous visit
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
## 2021-09-22 NOTE — TELEPHONE ENCOUNTER
Pancho Lo Si  I do not have any insurance information on this yuliya looks like he's self pay  Jorje Nielsen would be able to provide the patient with the self pay rate for Lupron    Lyndsay Sevilla
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
## 2021-09-23 VITALS
RESPIRATION RATE: 18 BRPM | DIASTOLIC BLOOD PRESSURE: 83 MMHG | HEART RATE: 64 BPM | SYSTOLIC BLOOD PRESSURE: 135 MMHG | OXYGEN SATURATION: 97 % | TEMPERATURE: 98 F

## 2021-09-23 LAB
ANION GAP SERPL CALCULATED.3IONS-SCNC: 11 MMOL/L (ref 4–13)
BUN SERPL-MCNC: 14 MG/DL (ref 5–25)
CA-I BLD-SCNC: 1.13 MMOL/L (ref 1.12–1.32)
CALCIUM SERPL-MCNC: 8.4 MG/DL (ref 8.3–10.1)
CHLORIDE SERPL-SCNC: 106 MMOL/L (ref 100–108)
CO2 SERPL-SCNC: 23 MMOL/L (ref 21–32)
CREAT SERPL-MCNC: 1.07 MG/DL (ref 0.6–1.3)
GFR SERPL CREATININE-BSD FRML MDRD: 75 ML/MIN/1.73SQ M
GLUCOSE P FAST SERPL-MCNC: 114 MG/DL (ref 65–99)
GLUCOSE SERPL-MCNC: 114 MG/DL (ref 65–140)
POTASSIUM SERPL-SCNC: 4.2 MMOL/L (ref 3.5–5.3)
SODIUM SERPL-SCNC: 140 MMOL/L (ref 136–145)

## 2021-09-23 PROCEDURE — 82330 ASSAY OF CALCIUM: CPT | Performed by: UROLOGY

## 2021-09-23 PROCEDURE — 80048 BASIC METABOLIC PNL TOTAL CA: CPT | Performed by: UROLOGY

## 2021-09-23 PROCEDURE — 94664 DEMO&/EVAL PT USE INHALER: CPT

## 2021-09-23 PROCEDURE — NC001 PR NO CHARGE: Performed by: NURSE PRACTITIONER

## 2021-09-23 PROCEDURE — 94760 N-INVAS EAR/PLS OXIMETRY 1: CPT

## 2021-09-23 PROCEDURE — 99024 POSTOP FOLLOW-UP VISIT: CPT | Performed by: NURSE PRACTITIONER

## 2021-09-23 RX ORDER — SENNOSIDES 8.6 MG
8.6 TABLET ORAL DAILY
Qty: 10 TABLET | Refills: 0 | Status: SHIPPED | OUTPATIENT
Start: 2021-09-23 | End: 2021-10-03

## 2021-09-23 RX ORDER — OXYCODONE HYDROCHLORIDE 5 MG/1
5 TABLET ORAL EVERY 6 HOURS PRN
Qty: 40 TABLET | Refills: 0 | Status: SHIPPED | OUTPATIENT
Start: 2021-09-23 | End: 2021-10-03

## 2021-09-23 RX ADMIN — ACETAMINOPHEN 650 MG: 325 TABLET, FILM COATED ORAL at 11:34

## 2021-09-23 RX ADMIN — CEFAZOLIN SODIUM 1000 MG: 1 SOLUTION INTRAVENOUS at 00:23

## 2021-09-23 RX ADMIN — KETOROLAC TROMETHAMINE 15 MG: 30 INJECTION, SOLUTION INTRAMUSCULAR; INTRAVENOUS at 17:12

## 2021-09-23 RX ADMIN — KETOROLAC TROMETHAMINE 15 MG: 30 INJECTION, SOLUTION INTRAMUSCULAR; INTRAVENOUS at 00:23

## 2021-09-23 RX ADMIN — KETOROLAC TROMETHAMINE 15 MG: 30 INJECTION, SOLUTION INTRAMUSCULAR; INTRAVENOUS at 11:34

## 2021-09-23 RX ADMIN — STANDARDIZED SENNA CONCENTRATE 8.6 MG: 8.6 TABLET ORAL at 11:34

## 2021-09-23 RX ADMIN — OXYCODONE HYDROCHLORIDE 5 MG: 5 TABLET ORAL at 15:23

## 2021-09-23 RX ADMIN — ACETAMINOPHEN 650 MG: 325 TABLET, FILM COATED ORAL at 17:12

## 2021-09-23 RX ADMIN — ACETAMINOPHEN 650 MG: 325 TABLET, FILM COATED ORAL at 05:48

## 2021-09-23 RX ADMIN — SIMETHICONE 80 MG: 80 TABLET, CHEWABLE ORAL at 01:47

## 2021-09-23 RX ADMIN — KETOROLAC TROMETHAMINE 15 MG: 30 INJECTION, SOLUTION INTRAMUSCULAR; INTRAVENOUS at 05:48

## 2021-09-23 RX ADMIN — ACETAMINOPHEN 650 MG: 325 TABLET, FILM COATED ORAL at 00:23
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
## 2021-09-23 NOTE — DISCHARGE INSTRUCTIONS
Lisis de adherencias abdominales   LO QUE NECESITA SABER:   La lisis de adherencias abdominales es un cirugía para extraer las adherencias del abdomen  Las adhesiones son bandas de tejido cicatricial  Las adherencias pueden hacer que los Marg[place] Jub[place] y los tejidos que los rodean se retuerzan, se salgan de banks sitio o se peguen [place]  INSTRUCCIONES SOBRE EL JOSE HOSPITALARIA:   Medicamentos:   · Analgésicos:  Usted podría recibir medicamento para quitarle o reducir el dolor  No espere a que el dolor sea muy intenso para antonieta el medicamento  · Antibióticos:  Joanna medicamento va a ayudar a combatir o prevenir himanshu infección  [place] ishmael antibióticos hasta que se los termine, aunque se sienta mejor  · Ablandadores de evacuación intestinal:  Joanna medicamento le hace más fácil tener himanshu evacuación intestinal  Es posible que deba antonieta joanna medicamento para prevenir el estreñimiento  · [place] ishmael medicamentos heike se le haya indicado  Consulte con banks médico si usted micheal que banks medicamento no le está ayudando o si presenta efectos secundarios  Infórmele si es alérgico a cualquier medicamento  Mantenga himanshu lista actualizada de los [place], las vitaminas y los productos herbales que yfn  Incluya los siguientes datos de los medicamentos: cantidad, frecuencia y motivo de administración  Traiga con usted la lista o los envases de la píldoras a ishmael citas de seguimiento  Lleve la lista de los medicamentos con usted en tunde de himanshu emergencia  Acuda a ishmael consultas de control con banks médico según le indicaron  Tendrá que regresar para que le revisen la herida y [place] puntos de sutura o las grapas  Anote ishmael preguntas para que se acuerde de hacerlas maday ishmael visitas  Descanse:  Descanse cuando usted sienta que es necesario  Empiece a hacer un poco más día a día  Regrese a ishmael actividades diarias heike se le haya indicado     Cuidado de la herida:  Es posible que deba cubrirse las incisiones con un vendaje hasta que concurra a banks consulta de seguimiento  Mantenga la herida limpia y seca  Cuando le permitan bañarse, lave la herida cuidadosamente con agua y [place]  Seque el área y póngale vendajes nuevos y limpios heike le indicaron  [place] ishmael vendajes cuando se mojen o ensucien  Prevenga estreñimiento:   · Consuma alimentos saludables:  Los alimentos saludables incluyen frutas, verduras, pan integral, productos lácteos bajos en grasa, frijoles, conor magras y pescado  Pregunte si necesita seguir himanshu dieta especial     · [place] líquidos según ishmael indicaciones:  Pregunte a banks médico sobre la cantidad de líquido que necesita antonieta todos los días y cuáles le recomienda  · Ejercicio:  Pregunte a banks médico acerca del mejor plan de ejercicio para usted  Comuníquese con banks médico si:   · Usted tiene fiebre o escalofríos  · Usted tiene tos o se siente débil y adolorido  · Banks incisión está giselle, inflamada o supurando pus  · Siente dolor en el abdomen o en el área del hombro que no se va o [place]  · Usted tiene preguntas o inquietudes acerca de banks condición o cuidado  Busque atención médica de inmediato o llame al 911 si:   · La incisión se abre  · La tamara empapa el vendaje  · Banks brazo o pierna se siente caliente, sensible y [place]  Se podría dontae inflamado y burnham  · Si de repente siente un desvanecimiento y falta de aire  · Si le duele el pecho cuando respira hondo o tose  · Usted expectora tamara  © Copyright [employer] 2018 Information is for End User's use only and may not be sold, redistributed or otherwise used for commercial purposes  All illustrations and images included in CareNotes® are the copyrighted property of A D A M , Inc  or [address] [place] [address] es sólo para uso en educación  Banks intención no es darle un consejo médico sobre enfermedades o tratamientos   Colsulte con banks [place] farmacéutico antes de seguir cualquier régimen médico para saber si es seguro y efectivo para usted

## 2021-09-23 NOTE — RESPIRATORY THERAPY NOTE
RT Protocol Note  Bacilio Tian 64 y o  male MRN: [id number]  Unit/Bed#: S -01 Encounter: [id number]    Assessment    Principal Problem:    Prostate cancer [facility])  Active Problems: Thrombocytopenia [facility])      Home Pulmonary Medications:  NONE     09/23/21 1812   Respiratory Protocol   Protocol Initiated? Yes   Protocol Selection Respiratory   Language Barrier? No   Medical & Social History Reviewed? Yes   Diagnostic Studies Reviewed? Yes   Physical Assessment Performed? Yes   Respiratory Plan Discontinue Protocol   Respiratory Assessment   Assessment Type Assess only   General Appearance Alert; Awake   Respiratory Pattern Normal   Chest Assessment Chest expansion symmetrical   Bilateral Breath Sounds Diminished   Cough Non-productive   Resp Comments Pt assessed for Respiratory Protocol  BS diminshed, SPO2 97% on room air  Pt without respiratory history  D/C Respiratory Protocol  May re-evaluate if pt has a change in respiratory status  Additional Assessments   Pulse 64   Respirations 18   SpO2 97 %          Past Medical History:   Diagnosis Date    Cancer ([address] )     Prostate    Thrombocytopenia (HCC)               Objective    Physical Exam:   Assessment Type: Assess only  General Appearance: Alert, Awake  Respiratory Pattern: Normal  Chest Assessment: Chest expansion symmetrical  Bilateral Breath Sounds: Diminished  Cough: Non-productive    Vitals:  Blood pressure 135/83, pulse 64, temperature 98 °F (36 7 °C), temperature source Oral, resp  rate 18, SpO2 97 %  Imaging and other studies: I have personally reviewed pertinent reports  Plan    Respiratory Plan: Discontinue Protocol        Resp Comments: Pt assessed for Respiratory Protocol  BS diminshed, SPO2 97% on room air  Pt without respiratory history  D/C Respiratory Protocol  May re-evaluate if pt has a change in respiratory status
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
## 2021-09-24 ENCOUNTER — TRANSITIONAL CARE MANAGEMENT (OUTPATIENT)
Dept: FAMILY MEDICINE CLINIC | Facility: CLINIC | Age: 61
End: 2021-09-24

## 2021-09-29 ENCOUNTER — TELEPHONE (OUTPATIENT)
Dept: FAMILY MEDICINE CLINIC | Facility: CLINIC | Age: 61
End: 2021-09-29

## 2021-09-29 NOTE — TELEPHONE ENCOUNTER
Was anyone able to verify if he has insurance or not? I do not have anything on file so he would have to be self pay for this or maybe there is assistance with the hospital for this?   Thanks  Sam King

## 2021-09-29 NOTE — TELEPHONE ENCOUNTER
Patient was called unable to make contact with patient to schedule a TCM   Phone is not accepting calls

## 2021-09-30 ENCOUNTER — PATIENT OUTREACH (OUTPATIENT)
Dept: UROLOGY | Facility: AMBULATORY SURGERY CENTER | Age: 61
End: 2021-09-30

## 2021-09-30 DIAGNOSIS — C61 PROSTATE CANCER (HCC): Primary | ICD-10-CM

## 2021-09-30 NOTE — TELEPHONE ENCOUNTER
Tito Cage will be receiving ADT and Radiation therapy for his prostate cancer  He does not have insurance  Referral placed to Oncology Social Work and Oncology finance aware  Scheduled for Lupron 10/8/21  Did outreach to Francisann Cage

## 2021-09-30 NOTE — PROGRESS NOTES
Ephriam Dakin will be having Radiation therapy and ADT for his prostate cancer  He has had consult with Dr Sy Jeffries in the past and per Carol Bruce RN, Rad Onc will see Ephriam Dakin for limited consult and SIM after SpaceOAR is placed  Called the  line   Sarah Button #[phone]  We reviewed timeline for ADT, SpaceOAR/ fiducial markers, Radiation SIM and start of RT  He is aware that [address] will be scheduled once he receives ADT  He is aware of the date, time, and location of his Lupron injection  He does not have insurance  Referral made to Oncology social workers and made Oncology finance aware  Ephriam Dakin is aware and appreciative of the referral  He was instructed to call if he has questions

## 2021-10-05 ENCOUNTER — PATIENT OUTREACH (OUTPATIENT)
Dept: CASE MANAGEMENT | Facility: HOSPITAL | Age: 61
End: 2021-10-05

## 2021-10-06 ENCOUNTER — HOSPITAL ENCOUNTER (OUTPATIENT)
Facility: HOSPITAL | Age: 61
Setting detail: OBSERVATION
Discharge: HOME | End: 2021-10-08
Attending: EMERGENCY MEDICINE | Admitting: INTERNAL MEDICINE

## 2021-10-06 ENCOUNTER — APPOINTMENT (EMERGENCY)
Dept: CT IMAGING | Facility: HOSPITAL | Age: 61
End: 2021-10-06

## 2021-10-06 ENCOUNTER — PATIENT OUTREACH (OUTPATIENT)
Dept: CASE MANAGEMENT | Facility: HOSPITAL | Age: 61
End: 2021-10-06

## 2021-10-06 ENCOUNTER — DOCUMENTATION (OUTPATIENT)
Dept: HEMATOLOGY ONCOLOGY | Facility: CLINIC | Age: 61
End: 2021-10-06

## 2021-10-06 DIAGNOSIS — R19.7 NAUSEA, VOMITING AND DIARRHEA: ICD-10-CM

## 2021-10-06 DIAGNOSIS — N17.9 AKI (ACUTE KIDNEY INJURY) (HCC): ICD-10-CM

## 2021-10-06 DIAGNOSIS — F32.A DEPRESSED: ICD-10-CM

## 2021-10-06 DIAGNOSIS — R10.9 ABDOMINAL PAIN: Primary | ICD-10-CM

## 2021-10-06 DIAGNOSIS — R11.2 NAUSEA, VOMITING AND DIARRHEA: ICD-10-CM

## 2021-10-06 LAB
ALBUMIN SERPL BCP-MCNC: 4.3 G/DL (ref 3.5–5)
ALP SERPL-CCNC: 110 U/L (ref 46–116)
ALT SERPL W P-5'-P-CCNC: 84 U/L (ref 12–78)
ANION GAP SERPL CALCULATED.3IONS-SCNC: 10 MMOL/L (ref 4–13)
AST SERPL W P-5'-P-CCNC: 34 U/L (ref 5–45)
BASOPHILS # BLD AUTO: 0.03 THOUSANDS/ΜL (ref 0–0.1)
BASOPHILS NFR BLD AUTO: 0 % (ref 0–1)
BILIRUB SERPL-MCNC: 1.18 MG/DL (ref 0.2–1)
BUN SERPL-MCNC: 19 MG/DL (ref 5–25)
CALCIUM SERPL-MCNC: 10.1 MG/DL (ref 8.3–10.1)
CHLORIDE SERPL-SCNC: 101 MMOL/L (ref 100–108)
CO2 SERPL-SCNC: 29 MMOL/L (ref 21–32)
CREAT SERPL-MCNC: 1.45 MG/DL (ref 0.6–1.3)
EOSINOPHIL # BLD AUTO: 0.01 THOUSAND/ΜL (ref 0–0.61)
EOSINOPHIL NFR BLD AUTO: 0 % (ref 0–6)
ERYTHROCYTE [DISTWIDTH] IN BLOOD BY AUTOMATED COUNT: 13.3 % (ref 11.6–15.1)
GFR SERPL CREATININE-BSD FRML MDRD: 52 ML/MIN/1.73SQ M
GLUCOSE SERPL-MCNC: 164 MG/DL (ref 65–140)
HCT VFR BLD AUTO: 56.8 % (ref 36.5–49.3)
HGB BLD-MCNC: 18.9 G/DL (ref 12–17)
IMM GRANULOCYTES # BLD AUTO: 0.07 THOUSAND/UL (ref 0–0.2)
IMM GRANULOCYTES NFR BLD AUTO: 1 % (ref 0–2)
LACTATE SERPL-SCNC: 3.2 MMOL/L (ref 0.5–2)
LIPASE SERPL-CCNC: 103 U/L (ref 73–393)
LYMPHOCYTES # BLD AUTO: 0.85 THOUSANDS/ΜL (ref 0.6–4.47)
LYMPHOCYTES NFR BLD AUTO: 8 % (ref 14–44)
MCH RBC QN AUTO: 28.4 PG (ref 26.8–34.3)
MCHC RBC AUTO-ENTMCNC: 33.3 G/DL (ref 31.4–37.4)
MCV RBC AUTO: 85 FL (ref 82–98)
MONOCYTES # BLD AUTO: 0.51 THOUSAND/ΜL (ref 0.17–1.22)
MONOCYTES NFR BLD AUTO: 5 % (ref 4–12)
NEUTROPHILS # BLD AUTO: 9.18 THOUSANDS/ΜL (ref 1.85–7.62)
NEUTS SEG NFR BLD AUTO: 86 % (ref 43–75)
NRBC BLD AUTO-RTO: 0 /100 WBCS
PLATELET # BLD AUTO: 156 THOUSANDS/UL (ref 149–390)
PMV BLD AUTO: 11.6 FL (ref 8.9–12.7)
POTASSIUM SERPL-SCNC: 5.3 MMOL/L (ref 3.5–5.3)
PROT SERPL-MCNC: 9.6 G/DL (ref 6.4–8.2)
RBC # BLD AUTO: 6.65 MILLION/UL (ref 3.88–5.62)
SODIUM SERPL-SCNC: 140 MMOL/L (ref 136–145)
WBC # BLD AUTO: 10.65 THOUSAND/UL (ref 4.31–10.16)

## 2021-10-06 PROCEDURE — 96361 HYDRATE IV INFUSION ADD-ON: CPT

## 2021-10-06 PROCEDURE — 85025 COMPLETE CBC W/AUTO DIFF WBC: CPT | Performed by: EMERGENCY MEDICINE

## 2021-10-06 PROCEDURE — 74177 CT ABD & PELVIS W/CONTRAST: CPT

## 2021-10-06 PROCEDURE — 96375 TX/PRO/DX INJ NEW DRUG ADDON: CPT

## 2021-10-06 PROCEDURE — 96374 THER/PROPH/DIAG INJ IV PUSH: CPT

## 2021-10-06 PROCEDURE — 99285 EMERGENCY DEPT VISIT HI MDM: CPT | Performed by: EMERGENCY MEDICINE

## 2021-10-06 PROCEDURE — 99285 EMERGENCY DEPT VISIT HI MDM: CPT

## 2021-10-06 PROCEDURE — 83605 ASSAY OF LACTIC ACID: CPT | Performed by: EMERGENCY MEDICINE

## 2021-10-06 PROCEDURE — 80053 COMPREHEN METABOLIC PANEL: CPT | Performed by: EMERGENCY MEDICINE

## 2021-10-06 PROCEDURE — 83690 ASSAY OF LIPASE: CPT | Performed by: EMERGENCY MEDICINE

## 2021-10-06 PROCEDURE — 36415 COLL VENOUS BLD VENIPUNCTURE: CPT | Performed by: EMERGENCY MEDICINE

## 2021-10-06 RX ORDER — HYDROMORPHONE HCL/PF 1 MG/ML
1 SYRINGE (ML) INJECTION ONCE
Status: COMPLETED | OUTPATIENT
Start: 2021-10-06 | End: 2021-10-06

## 2021-10-06 RX ORDER — ONDANSETRON 2 MG/ML
4 INJECTION INTRAMUSCULAR; INTRAVENOUS ONCE
Status: COMPLETED | OUTPATIENT
Start: 2021-10-06 | End: 2021-10-06

## 2021-10-06 RX ORDER — METOCLOPRAMIDE HYDROCHLORIDE 5 MG/ML
10 INJECTION INTRAMUSCULAR; INTRAVENOUS ONCE
Status: COMPLETED | OUTPATIENT
Start: 2021-10-06 | End: 2021-10-06

## 2021-10-06 RX ADMIN — SODIUM CHLORIDE 1000 ML: 0.9 INJECTION, SOLUTION INTRAVENOUS at 23:37

## 2021-10-06 RX ADMIN — METOCLOPRAMIDE 10 MG: 5 INJECTION, SOLUTION INTRAMUSCULAR; INTRAVENOUS at 23:11

## 2021-10-06 RX ADMIN — SODIUM CHLORIDE 1000 ML: 0.9 INJECTION, SOLUTION INTRAVENOUS at 22:44

## 2021-10-06 RX ADMIN — HYDROMORPHONE HYDROCHLORIDE 1 MG: 1 INJECTION, SOLUTION INTRAMUSCULAR; INTRAVENOUS; SUBCUTANEOUS at 22:44

## 2021-10-06 RX ADMIN — IOHEXOL 100 ML: 350 INJECTION, SOLUTION INTRAVENOUS at 23:48

## 2021-10-06 RX ADMIN — ONDANSETRON 4 MG: 2 INJECTION INTRAMUSCULAR; INTRAVENOUS at 22:42

## 2021-10-06 NOTE — TELEPHONE ENCOUNTER
Per MICHAEL Becerril, Frannie Gonzalez has [phone] at 100% and should not incur any bills from [address]

## 2021-10-07 ENCOUNTER — APPOINTMENT (OUTPATIENT)
Dept: RADIOLOGY | Facility: HOSPITAL | Age: 61
End: 2021-10-07

## 2021-10-07 PROBLEM — E87.2 LACTIC ACIDOSIS: Status: ACTIVE | Noted: 2021-10-07

## 2021-10-07 PROBLEM — N17.9 ACUTE KIDNEY INJURY (HCC): Status: ACTIVE | Noted: 2021-10-07

## 2021-10-07 PROBLEM — E87.20 LACTIC ACIDOSIS: Status: ACTIVE | Noted: 2021-10-07

## 2021-10-07 PROBLEM — R11.2 NAUSEA VOMITING AND DIARRHEA: Status: ACTIVE | Noted: 2021-10-07

## 2021-10-07 PROBLEM — R19.7 NAUSEA VOMITING AND DIARRHEA: Status: ACTIVE | Noted: 2021-10-07

## 2021-10-07 LAB
ANION GAP SERPL CALCULATED.3IONS-SCNC: 12 MMOL/L (ref 4–13)
BASOPHILS # BLD AUTO: 0.02 THOUSANDS/ΜL (ref 0–0.1)
BASOPHILS NFR BLD AUTO: 0 % (ref 0–1)
BUN SERPL-MCNC: 16 MG/DL (ref 5–25)
CALCIUM SERPL-MCNC: 8.7 MG/DL (ref 8.3–10.1)
CHLORIDE SERPL-SCNC: 105 MMOL/L (ref 100–108)
CO2 SERPL-SCNC: 25 MMOL/L (ref 21–32)
CREAT SERPL-MCNC: 1.08 MG/DL (ref 0.6–1.3)
EOSINOPHIL # BLD AUTO: 0.08 THOUSAND/ΜL (ref 0–0.61)
EOSINOPHIL NFR BLD AUTO: 1 % (ref 0–6)
ERYTHROCYTE [DISTWIDTH] IN BLOOD BY AUTOMATED COUNT: 13.3 % (ref 11.6–15.1)
GFR SERPL CREATININE-BSD FRML MDRD: 74 ML/MIN/1.73SQ M
GLUCOSE P FAST SERPL-MCNC: 125 MG/DL (ref 65–99)
GLUCOSE SERPL-MCNC: 125 MG/DL (ref 65–140)
HCT VFR BLD AUTO: 48.9 % (ref 36.5–49.3)
HGB BLD-MCNC: 16 G/DL (ref 12–17)
IMM GRANULOCYTES # BLD AUTO: 0.02 THOUSAND/UL (ref 0–0.2)
IMM GRANULOCYTES NFR BLD AUTO: 0 % (ref 0–2)
LACTATE SERPL-SCNC: 1.4 MMOL/L (ref 0.5–2)
LACTATE SERPL-SCNC: 2.1 MMOL/L (ref 0.5–2)
LACTATE SERPL-SCNC: 2.9 MMOL/L (ref 0.5–2)
LYMPHOCYTES # BLD AUTO: 2.03 THOUSANDS/ΜL (ref 0.6–4.47)
LYMPHOCYTES NFR BLD AUTO: 25 % (ref 14–44)
MCH RBC QN AUTO: 28.2 PG (ref 26.8–34.3)
MCHC RBC AUTO-ENTMCNC: 32.7 G/DL (ref 31.4–37.4)
MCV RBC AUTO: 86 FL (ref 82–98)
MONOCYTES # BLD AUTO: 0.56 THOUSAND/ΜL (ref 0.17–1.22)
MONOCYTES NFR BLD AUTO: 7 % (ref 4–12)
NEUTROPHILS # BLD AUTO: 5.29 THOUSANDS/ΜL (ref 1.85–7.62)
NEUTS SEG NFR BLD AUTO: 67 % (ref 43–75)
NRBC BLD AUTO-RTO: 0 /100 WBCS
PLATELET # BLD AUTO: 126 THOUSANDS/UL (ref 149–390)
PMV BLD AUTO: 10.9 FL (ref 8.9–12.7)
POTASSIUM SERPL-SCNC: 4 MMOL/L (ref 3.5–5.3)
RBC # BLD AUTO: 5.68 MILLION/UL (ref 3.88–5.62)
SODIUM SERPL-SCNC: 142 MMOL/L (ref 136–145)
WBC # BLD AUTO: 8 THOUSAND/UL (ref 4.31–10.16)

## 2021-10-07 PROCEDURE — NC001 PR NO CHARGE: Performed by: SURGERY

## 2021-10-07 PROCEDURE — 90682 RIV4 VACC RECOMBINANT DNA IM: CPT | Performed by: NURSE PRACTITIONER

## 2021-10-07 PROCEDURE — 36415 COLL VENOUS BLD VENIPUNCTURE: CPT | Performed by: EMERGENCY MEDICINE

## 2021-10-07 PROCEDURE — 99220 PR INITIAL OBSERVATION CARE/DAY 70 MINUTES: CPT | Performed by: INTERNAL MEDICINE

## 2021-10-07 PROCEDURE — 90471 IMMUNIZATION ADMIN: CPT | Performed by: NURSE PRACTITIONER

## 2021-10-07 PROCEDURE — 83605 ASSAY OF LACTIC ACID: CPT | Performed by: EMERGENCY MEDICINE

## 2021-10-07 PROCEDURE — 96376 TX/PRO/DX INJ SAME DRUG ADON: CPT

## 2021-10-07 PROCEDURE — 85025 COMPLETE CBC W/AUTO DIFF WBC: CPT | Performed by: NURSE PRACTITIONER

## 2021-10-07 PROCEDURE — 80048 BASIC METABOLIC PNL TOTAL CA: CPT | Performed by: NURSE PRACTITIONER

## 2021-10-07 PROCEDURE — 74022 RADEX COMPL AQT ABD SERIES: CPT

## 2021-10-07 PROCEDURE — 83605 ASSAY OF LACTIC ACID: CPT | Performed by: NURSE PRACTITIONER

## 2021-10-07 RX ORDER — OXYCODONE HYDROCHLORIDE 5 MG/1
5 TABLET ORAL EVERY 4 HOURS PRN
Status: DISCONTINUED | OUTPATIENT
Start: 2021-10-07 | End: 2021-10-08 | Stop reason: HOSPADM

## 2021-10-07 RX ORDER — SODIUM CHLORIDE 9 MG/ML
75 INJECTION, SOLUTION INTRAVENOUS CONTINUOUS
Status: CANCELLED | OUTPATIENT
Start: 2021-10-07

## 2021-10-07 RX ORDER — SENNOSIDES 8.6 MG
8.6 TABLET ORAL DAILY
Status: DISCONTINUED | OUTPATIENT
Start: 2021-10-07 | End: 2021-10-08

## 2021-10-07 RX ORDER — HYDROMORPHONE HCL IN WATER/PF 6 MG/30 ML
0.2 PATIENT CONTROLLED ANALGESIA SYRINGE INTRAVENOUS EVERY 6 HOURS PRN
Status: DISCONTINUED | OUTPATIENT
Start: 2021-10-07 | End: 2021-10-08 | Stop reason: HOSPADM

## 2021-10-07 RX ORDER — DICYCLOMINE HCL 20 MG
20 TABLET ORAL ONCE
Status: COMPLETED | OUTPATIENT
Start: 2021-10-07 | End: 2021-10-07

## 2021-10-07 RX ORDER — SIMETHICONE 80 MG
80 TABLET,CHEWABLE ORAL 4 TIMES DAILY PRN
Status: DISCONTINUED | OUTPATIENT
Start: 2021-10-07 | End: 2021-10-08 | Stop reason: HOSPADM

## 2021-10-07 RX ORDER — HYDROMORPHONE HCL/PF 1 MG/ML
1 SYRINGE (ML) INJECTION ONCE
Status: COMPLETED | OUTPATIENT
Start: 2021-10-07 | End: 2021-10-07

## 2021-10-07 RX ORDER — ACETAMINOPHEN 325 MG/1
650 TABLET ORAL EVERY 6 HOURS PRN
Status: DISCONTINUED | OUTPATIENT
Start: 2021-10-07 | End: 2021-10-08 | Stop reason: HOSPADM

## 2021-10-07 RX ORDER — DIPHENHYDRAMINE HCL 25 MG
50 TABLET ORAL
Status: DISCONTINUED | OUTPATIENT
Start: 2021-10-07 | End: 2021-10-08 | Stop reason: HOSPADM

## 2021-10-07 RX ORDER — SODIUM CHLORIDE 9 MG/ML
100 INJECTION, SOLUTION INTRAVENOUS CONTINUOUS
Status: DISPENSED | OUTPATIENT
Start: 2021-10-07 | End: 2021-10-07

## 2021-10-07 RX ORDER — ONDANSETRON 2 MG/ML
4 INJECTION INTRAMUSCULAR; INTRAVENOUS EVERY 6 HOURS PRN
Status: DISCONTINUED | OUTPATIENT
Start: 2021-10-07 | End: 2021-10-08 | Stop reason: HOSPADM

## 2021-10-07 RX ORDER — TAMSULOSIN HYDROCHLORIDE 0.4 MG/1
0.4 CAPSULE ORAL
Status: DISCONTINUED | OUTPATIENT
Start: 2021-10-07 | End: 2021-10-08 | Stop reason: HOSPADM

## 2021-10-07 RX ORDER — MAGNESIUM HYDROXIDE/ALUMINUM HYDROXICE/SIMETHICONE 120; 1200; 1200 MG/30ML; MG/30ML; MG/30ML
30 SUSPENSION ORAL EVERY 6 HOURS PRN
Status: DISCONTINUED | OUTPATIENT
Start: 2021-10-07 | End: 2021-10-08 | Stop reason: HOSPADM

## 2021-10-07 RX ADMIN — TAMSULOSIN HYDROCHLORIDE 0.4 MG: 0.4 CAPSULE ORAL at 16:28

## 2021-10-07 RX ADMIN — ALUMINUM HYDROXIDE, MAGNESIUM HYDROXIDE, AND SIMETHICONE 30 ML: 200; 200; 20 SUSPENSION ORAL at 16:29

## 2021-10-07 RX ADMIN — OXYCODONE HYDROCHLORIDE 5 MG: 5 TABLET ORAL at 21:39

## 2021-10-07 RX ADMIN — ONDANSETRON 4 MG: 2 INJECTION INTRAMUSCULAR; INTRAVENOUS at 21:38

## 2021-10-07 RX ADMIN — HYDROMORPHONE HYDROCHLORIDE 1 MG: 1 INJECTION, SOLUTION INTRAMUSCULAR; INTRAVENOUS; SUBCUTANEOUS at 01:13

## 2021-10-07 RX ADMIN — OXYCODONE HYDROCHLORIDE 5 MG: 5 TABLET ORAL at 09:46

## 2021-10-07 RX ADMIN — SODIUM CHLORIDE 100 ML/HR: 0.9 INJECTION, SOLUTION INTRAVENOUS at 05:50

## 2021-10-07 RX ADMIN — ENOXAPARIN SODIUM 40 MG: 40 INJECTION SUBCUTANEOUS at 09:46

## 2021-10-07 RX ADMIN — DICYCLOMINE HYDROCHLORIDE 20 MG: 20 TABLET ORAL at 01:13

## 2021-10-07 RX ADMIN — INFLUENZA A VIRUS A/WISCONSIN/588/2019 (H1N1) RECOMBINANT HEMAGGLUTININ ANTIGEN, INFLUENZA A VIRUS A/TASMANIA/503/2020 (H3N2) RECOMBINANT HEMAGGLUTININ ANTIGEN, INFLUENZA B VIRUS B/WASHINGTON/02/2019 RECOMBINANT HEMAGGLUTININ ANTIGEN, AND INFLUENZA B VIRUS B/PHUKET/3073/2013 RECOMBINANT HEMAGGLUTININ ANTIGEN 0.5 ML: 45; 45; 45; 45 INJECTION INTRAMUSCULAR at 09:48

## 2021-10-07 RX ADMIN — OXYCODONE HYDROCHLORIDE 5 MG: 5 TABLET ORAL at 04:57

## 2021-10-07 NOTE — TELEPHONE ENCOUNTER
Returned call to patients wife and re-scheduled patients Lupron injection to 10/13/2021 in the [facility] at 0930  She verbalized understanding

## 2021-10-07 NOTE — TELEPHONE ENCOUNTER
Patient's wife is calling to say he is hospitalized and needs to reschedule appointment for 10/8/21  Requesting call back [phone]

## 2021-10-08 ENCOUNTER — TELEPHONE (OUTPATIENT)
Dept: UROLOGY | Facility: CLINIC | Age: 61
End: 2021-10-08

## 2021-10-08 ENCOUNTER — TRANSITIONAL CARE MANAGEMENT (OUTPATIENT)
Dept: FAMILY MEDICINE CLINIC | Facility: CLINIC | Age: 61
End: 2021-10-08

## 2021-10-08 VITALS
DIASTOLIC BLOOD PRESSURE: 58 MMHG | SYSTOLIC BLOOD PRESSURE: 103 MMHG | TEMPERATURE: 97.5 F | HEIGHT: 68 IN | BODY MASS INDEX: 30.97 KG/M2 | RESPIRATION RATE: 18 BRPM | OXYGEN SATURATION: 98 % | WEIGHT: 204.37 LBS | HEART RATE: 95 BPM

## 2021-10-08 LAB
ANION GAP SERPL CALCULATED.3IONS-SCNC: 8 MMOL/L (ref 4–13)
BASOPHILS # BLD AUTO: 0.01 THOUSANDS/ΜL (ref 0–0.1)
BASOPHILS NFR BLD AUTO: 0 % (ref 0–1)
BUN SERPL-MCNC: 12 MG/DL (ref 5–25)
CALCIUM SERPL-MCNC: 8.4 MG/DL (ref 8.3–10.1)
CHLORIDE SERPL-SCNC: 106 MMOL/L (ref 100–108)
CO2 SERPL-SCNC: 27 MMOL/L (ref 21–32)
CREAT SERPL-MCNC: 1.01 MG/DL (ref 0.6–1.3)
EOSINOPHIL # BLD AUTO: 0.11 THOUSAND/ΜL (ref 0–0.61)
EOSINOPHIL NFR BLD AUTO: 2 % (ref 0–6)
ERYTHROCYTE [DISTWIDTH] IN BLOOD BY AUTOMATED COUNT: 12.9 % (ref 11.6–15.1)
GFR SERPL CREATININE-BSD FRML MDRD: 80 ML/MIN/1.73SQ M
GLUCOSE SERPL-MCNC: 94 MG/DL (ref 65–140)
HCT VFR BLD AUTO: 44.4 % (ref 36.5–49.3)
HGB BLD-MCNC: 14.8 G/DL (ref 12–17)
IMM GRANULOCYTES # BLD AUTO: 0.01 THOUSAND/UL (ref 0–0.2)
IMM GRANULOCYTES NFR BLD AUTO: 0 % (ref 0–2)
LYMPHOCYTES # BLD AUTO: 1.89 THOUSANDS/ΜL (ref 0.6–4.47)
LYMPHOCYTES NFR BLD AUTO: 41 % (ref 14–44)
MCH RBC QN AUTO: 28.9 PG (ref 26.8–34.3)
MCHC RBC AUTO-ENTMCNC: 33.3 G/DL (ref 31.4–37.4)
MCV RBC AUTO: 87 FL (ref 82–98)
MONOCYTES # BLD AUTO: 0.49 THOUSAND/ΜL (ref 0.17–1.22)
MONOCYTES NFR BLD AUTO: 11 % (ref 4–12)
NEUTROPHILS # BLD AUTO: 2.14 THOUSANDS/ΜL (ref 1.85–7.62)
NEUTS SEG NFR BLD AUTO: 46 % (ref 43–75)
NRBC BLD AUTO-RTO: 0 /100 WBCS
PLATELET # BLD AUTO: 114 THOUSANDS/UL (ref 149–390)
PMV BLD AUTO: 11.7 FL (ref 8.9–12.7)
POTASSIUM SERPL-SCNC: 3.8 MMOL/L (ref 3.5–5.3)
RBC # BLD AUTO: 5.12 MILLION/UL (ref 3.88–5.62)
SODIUM SERPL-SCNC: 141 MMOL/L (ref 136–145)
WBC # BLD AUTO: 4.65 THOUSAND/UL (ref 4.31–10.16)

## 2021-10-08 PROCEDURE — 85025 COMPLETE CBC W/AUTO DIFF WBC: CPT | Performed by: INTERNAL MEDICINE

## 2021-10-08 PROCEDURE — 80048 BASIC METABOLIC PNL TOTAL CA: CPT | Performed by: INTERNAL MEDICINE

## 2021-10-08 PROCEDURE — 99217 PR OBSERVATION CARE DISCHARGE MANAGEMENT: CPT | Performed by: INTERNAL MEDICINE

## 2021-10-08 RX ORDER — PANTOPRAZOLE SODIUM 40 MG/1
40 TABLET, DELAYED RELEASE ORAL DAILY
Qty: 30 TABLET | Refills: 0 | Status: SHIPPED | OUTPATIENT
Start: 2021-10-08

## 2021-10-08 RX ADMIN — ENOXAPARIN SODIUM 40 MG: 40 INJECTION SUBCUTANEOUS at 10:00

## 2021-10-11 ENCOUNTER — TELEPHONE (OUTPATIENT)
Dept: FAMILY MEDICINE CLINIC | Facility: CLINIC | Age: 61
End: 2021-10-11

## 2021-10-13 ENCOUNTER — PROCEDURE VISIT (OUTPATIENT)
Dept: UROLOGY | Facility: CLINIC | Age: 61
End: 2021-10-13

## 2021-10-13 ENCOUNTER — TELEPHONE (OUTPATIENT)
Dept: UROLOGY | Facility: CLINIC | Age: 61
End: 2021-10-13

## 2021-10-13 VITALS
DIASTOLIC BLOOD PRESSURE: 80 MMHG | WEIGHT: 204 LBS | SYSTOLIC BLOOD PRESSURE: 120 MMHG | HEIGHT: 68 IN | BODY MASS INDEX: 30.92 KG/M2 | RESPIRATION RATE: 20 BRPM | HEART RATE: 72 BPM

## 2021-10-13 DIAGNOSIS — C61 PROSTATE CANCER (HCC): Primary | ICD-10-CM

## 2021-10-13 DIAGNOSIS — C61 PROSTATE CANCER (HCC): ICD-10-CM

## 2021-10-13 PROCEDURE — 96402 CHEMO HORMON ANTINEOPL SQ/IM: CPT

## 2021-10-14 NOTE — TELEPHONE ENCOUNTER
Fidel received Lupron 10/13/21  Please schedule SpaceOAR/ fiducial markers  He will then need limited consult with Dr Wyvonnia Hamman followed by [facility]

## 2021-10-18 NOTE — TELEPHONE ENCOUNTER
Per previous note, Dr Crystal Mcpherson will need [facility] scheduled for limited consult followed by [facility]  Prostatectomy was aborted  He received Lupron 10/13/21 and is scheduled for SpaceOAR 11/23/21

## 2021-10-18 NOTE — TELEPHONE ENCOUNTER
Patient scheduled for 11/23/2021 at [facility] with Dr Franco Hair     -instruction packet mailed  -patient will need to be nPO, have a  and use enema 1 hour prior to arrival  -patient will need to avoid any potentially blood thinning medications 7 days prior  -Self Pay- [address] care 100%

## 2021-10-28 NOTE — TELEPHONE ENCOUNTER
Pt is not on recall, would like me to put the pt on the recall for 1/14/21 for Lupron and AP visit? Thanks!

## 2021-10-28 NOTE — TELEPHONE ENCOUNTER
Please schedule Urology follow up for [facility]  Per Dr Sourav King previous note, he will need at least 2 years of ADT  He received Lupron 10/13/21  He will need follow up with AP for Lupron and PSA PTV

## 2021-11-17 ENCOUNTER — TELEPHONE (OUTPATIENT)
Dept: UROLOGY | Facility: CLINIC | Age: 61
End: 2021-11-17

## 2021-11-22 ENCOUNTER — ANESTHESIA EVENT (OUTPATIENT)
Dept: GASTROENTEROLOGY | Facility: HOSPITAL | Age: 61
End: 2021-11-22
Payer: COMMERCIAL

## 2021-11-22 RX ORDER — SODIUM CHLORIDE, SODIUM LACTATE, POTASSIUM CHLORIDE, CALCIUM CHLORIDE 600; 310; 30; 20 MG/100ML; MG/100ML; MG/100ML; MG/100ML
125 INJECTION, SOLUTION INTRAVENOUS CONTINUOUS
Status: CANCELLED | OUTPATIENT
Start: 2021-11-22

## 2021-11-23 ENCOUNTER — ANESTHESIA (OUTPATIENT)
Dept: GASTROENTEROLOGY | Facility: HOSPITAL | Age: 61
End: 2021-11-23
Payer: COMMERCIAL

## 2021-11-23 ENCOUNTER — HOSPITAL ENCOUNTER (OUTPATIENT)
Facility: HOSPITAL | Age: 61
Setting detail: OUTPATIENT SURGERY
Discharge: HOME | End: 2021-11-23
Attending: UROLOGY | Admitting: UROLOGY
Payer: COMMERCIAL

## 2021-11-23 VITALS
WEIGHT: 207.23 LBS | HEIGHT: 68 IN | HEART RATE: 62 BPM | DIASTOLIC BLOOD PRESSURE: 81 MMHG | OXYGEN SATURATION: 98 % | SYSTOLIC BLOOD PRESSURE: 118 MMHG | TEMPERATURE: 97.5 F | BODY MASS INDEX: 31.41 KG/M2 | RESPIRATION RATE: 16 BRPM

## 2021-11-23 PROCEDURE — NC001 PR NO CHARGE: Performed by: UROLOGY

## 2021-11-23 PROCEDURE — A4648 IMPLANTABLE TISSUE MARKER: HCPCS | Performed by: UROLOGY

## 2021-11-23 PROCEDURE — 55874 TPRNL PLMT BIODEGRDABL MATRL: CPT | Performed by: UROLOGY

## 2021-11-23 PROCEDURE — C1889 IMPLANT/INSERT DEVICE, NOC: HCPCS | Performed by: UROLOGY

## 2021-11-23 PROCEDURE — 55876 PLACE RT DEVICE/MARKER PROS: CPT | Performed by: UROLOGY

## 2021-11-23 DEVICE — STERILE PLACEMENT NEEDLES (17GA ETW X 20CM) WITH BONE WAX AND (1.2 X 3MM) SOFT TISSUE GOLD MARKER [3]
Type: IMPLANTABLE DEVICE | Status: FUNCTIONAL
Brand: FIDUCIAL MARKER KIT

## 2021-11-23 DEVICE — SPACEOAR SYSTEMS
Type: IMPLANTABLE DEVICE | Site: PERIANAL | Status: FUNCTIONAL
Brand: SPACEOAR VUE™ SYSTEM - 10ML

## 2021-11-23 RX ORDER — PROPOFOL 10 MG/ML
INJECTION, EMULSION INTRAVENOUS CONTINUOUS PRN
Status: DISCONTINUED | OUTPATIENT
Start: 2021-11-23 | End: 2021-11-23

## 2021-11-23 RX ORDER — FENTANYL CITRATE 50 UG/ML
INJECTION, SOLUTION INTRAMUSCULAR; INTRAVENOUS AS NEEDED
Status: DISCONTINUED | OUTPATIENT
Start: 2021-11-23 | End: 2021-11-23

## 2021-11-23 RX ORDER — BUPIVACAINE HYDROCHLORIDE 5 MG/ML
INJECTION, SOLUTION EPIDURAL; INTRACAUDAL AS NEEDED
Status: DISCONTINUED | OUTPATIENT
Start: 2021-11-23 | End: 2021-11-23 | Stop reason: HOSPADM

## 2021-11-23 RX ORDER — LIDOCAINE HYDROCHLORIDE 10 MG/ML
INJECTION, SOLUTION EPIDURAL; INFILTRATION; INTRACAUDAL; PERINEURAL AS NEEDED
Status: DISCONTINUED | OUTPATIENT
Start: 2021-11-23 | End: 2021-11-23 | Stop reason: HOSPADM

## 2021-11-23 RX ORDER — KETOROLAC TROMETHAMINE 30 MG/ML
15 INJECTION, SOLUTION INTRAMUSCULAR; INTRAVENOUS EVERY 6 HOURS PRN
Status: CANCELLED | OUTPATIENT
Start: 2021-11-23 | End: 2021-11-24

## 2021-11-23 RX ORDER — ONDANSETRON 2 MG/ML
INJECTION INTRAMUSCULAR; INTRAVENOUS AS NEEDED
Status: DISCONTINUED | OUTPATIENT
Start: 2021-11-23 | End: 2021-11-23

## 2021-11-23 RX ORDER — PROPOFOL 10 MG/ML
INJECTION, EMULSION INTRAVENOUS AS NEEDED
Status: DISCONTINUED | OUTPATIENT
Start: 2021-11-23 | End: 2021-11-23

## 2021-11-23 RX ORDER — LIDOCAINE HYDROCHLORIDE 10 MG/ML
INJECTION, SOLUTION EPIDURAL; INFILTRATION; INTRACAUDAL; PERINEURAL AS NEEDED
Status: DISCONTINUED | OUTPATIENT
Start: 2021-11-23 | End: 2021-11-23

## 2021-11-23 RX ORDER — SODIUM CHLORIDE, SODIUM LACTATE, POTASSIUM CHLORIDE, CALCIUM CHLORIDE 600; 310; 30; 20 MG/100ML; MG/100ML; MG/100ML; MG/100ML
INJECTION, SOLUTION INTRAVENOUS CONTINUOUS PRN
Status: DISCONTINUED | OUTPATIENT
Start: 2021-11-23 | End: 2021-11-23

## 2021-11-23 RX ORDER — ACETAMINOPHEN 325 MG/1
650 TABLET ORAL EVERY 6 HOURS PRN
Status: CANCELLED | OUTPATIENT
Start: 2021-11-23

## 2021-11-23 RX ADMIN — FENTANYL CITRATE 50 MCG: 50 INJECTION INTRAMUSCULAR; INTRAVENOUS at 15:10

## 2021-11-23 RX ADMIN — PROPOFOL 50 MG: 10 INJECTION, EMULSION INTRAVENOUS at 15:10

## 2021-11-23 RX ADMIN — DEXTROSE 1000 MG: 50 INJECTION, SOLUTION INTRAVENOUS at 14:46

## 2021-11-23 RX ADMIN — LIDOCAINE HYDROCHLORIDE 50 MG: 10 INJECTION, SOLUTION EPIDURAL; INFILTRATION; INTRACAUDAL; PERINEURAL at 15:10

## 2021-11-23 RX ADMIN — FENTANYL CITRATE 25 MCG: 50 INJECTION INTRAMUSCULAR; INTRAVENOUS at 15:12

## 2021-11-23 RX ADMIN — PROPOFOL 130 MCG/KG/MIN: 10 INJECTION, EMULSION INTRAVENOUS at 15:10

## 2021-11-23 RX ADMIN — SODIUM CHLORIDE, SODIUM LACTATE, POTASSIUM CHLORIDE, AND CALCIUM CHLORIDE: .6; .31; .03; .02 INJECTION, SOLUTION INTRAVENOUS at 15:05

## 2021-11-23 RX ADMIN — FENTANYL CITRATE 25 MCG: 50 INJECTION INTRAMUSCULAR; INTRAVENOUS at 15:21

## 2021-11-23 RX ADMIN — ONDANSETRON 4 MG: 2 INJECTION INTRAMUSCULAR; INTRAVENOUS at 15:09

## 2021-12-01 ENCOUNTER — CLINICAL SUPPORT (OUTPATIENT)
Dept: RADIATION ONCOLOGY | Facility: CLINIC | Age: 61
End: 2021-12-01
Attending: RADIOLOGY

## 2021-12-01 ENCOUNTER — APPOINTMENT (OUTPATIENT)
Dept: RADIATION ONCOLOGY | Facility: CLINIC | Age: 61
End: 2021-12-01
Attending: RADIOLOGY
Payer: COMMERCIAL

## 2021-12-01 ENCOUNTER — RADIATION ONCOLOGY CONSULT (OUTPATIENT)
Dept: RADIATION ONCOLOGY | Facility: CLINIC | Age: 61
End: 2021-12-01
Attending: RADIOLOGY

## 2021-12-01 VITALS
SYSTOLIC BLOOD PRESSURE: 133 MMHG | DIASTOLIC BLOOD PRESSURE: 78 MMHG | HEART RATE: 74 BPM | WEIGHT: 208.11 LBS | TEMPERATURE: 98.7 F | BODY MASS INDEX: 31.64 KG/M2 | RESPIRATION RATE: 16 BRPM

## 2021-12-01 DIAGNOSIS — C61 PROSTATE CANCER (HCC): Primary | ICD-10-CM

## 2021-12-01 PROCEDURE — 77399 UNLISTED PX MED RADJ PHYSICS: CPT | Performed by: RADIOLOGY

## 2021-12-01 PROCEDURE — 77334 RADIATION TREATMENT AID(S): CPT | Performed by: RADIOLOGY

## 2021-12-01 PROCEDURE — 99244 OFF/OP CNSLTJ NEW/EST MOD 40: CPT | Performed by: RADIOLOGY

## 2021-12-01 PROCEDURE — 99211 OFF/OP EST MAY X REQ PHY/QHP: CPT | Performed by: RADIOLOGY

## 2021-12-01 PROCEDURE — 77263 THER RADIOLOGY TX PLNG CPLX: CPT | Performed by: RADIOLOGY

## 2021-12-06 ENCOUNTER — PATIENT OUTREACH (OUTPATIENT)
Dept: CASE MANAGEMENT | Facility: OTHER | Age: 61
End: 2021-12-06

## 2021-12-09 NOTE — TELEPHONE ENCOUNTER
I see that patient is already on recall list for Lupron/Follow up for 4/2022  FYI end of radiation date is 2/14/2022

## 2021-12-14 PROCEDURE — 77338 DESIGN MLC DEVICE FOR IMRT: CPT | Performed by: RADIOLOGY

## 2021-12-14 PROCEDURE — 77301 RADIOTHERAPY DOSE PLAN IMRT: CPT | Performed by: RADIOLOGY

## 2021-12-14 PROCEDURE — 77300 RADIATION THERAPY DOSE PLAN: CPT | Performed by: RADIOLOGY

## 2021-12-15 PROCEDURE — 77385 HB NTSTY MODUL RAD TX DLVR SMPL: CPT | Performed by: RADIOLOGY

## 2021-12-15 PROCEDURE — 77427 RADIATION TX MANAGEMENT X5: CPT | Performed by: RADIOLOGY

## 2021-12-16 ENCOUNTER — APPOINTMENT (OUTPATIENT)
Dept: LAB | Facility: HOSPITAL | Age: 61
End: 2021-12-16
Attending: RADIOLOGY
Payer: COMMERCIAL

## 2021-12-16 DIAGNOSIS — C61 PROSTATE CANCER (HCC): Primary | ICD-10-CM

## 2021-12-16 DIAGNOSIS — C61 PROSTATE CANCER (HCC): ICD-10-CM

## 2021-12-16 LAB
BASOPHILS # BLD AUTO: 0.01 THOUSANDS/ΜL (ref 0–0.1)
BASOPHILS NFR BLD AUTO: 0 % (ref 0–1)
EOSINOPHIL # BLD AUTO: 0.08 THOUSAND/ΜL (ref 0–0.61)
EOSINOPHIL NFR BLD AUTO: 2 % (ref 0–6)
ERYTHROCYTE [DISTWIDTH] IN BLOOD BY AUTOMATED COUNT: 13 % (ref 11.6–15.1)
HCT VFR BLD AUTO: 42.3 % (ref 36.5–49.3)
HGB BLD-MCNC: 14.1 G/DL (ref 12–17)
IMM GRANULOCYTES # BLD AUTO: 0.01 THOUSAND/UL (ref 0–0.2)
IMM GRANULOCYTES NFR BLD AUTO: 0 % (ref 0–2)
LYMPHOCYTES # BLD AUTO: 2.26 THOUSANDS/ΜL (ref 0.6–4.47)
LYMPHOCYTES NFR BLD AUTO: 57 % (ref 14–44)
MCH RBC QN AUTO: 28 PG (ref 26.8–34.3)
MCHC RBC AUTO-ENTMCNC: 33.3 G/DL (ref 31.4–37.4)
MCV RBC AUTO: 84 FL (ref 82–98)
MONOCYTES # BLD AUTO: 0.52 THOUSAND/ΜL (ref 0.17–1.22)
MONOCYTES NFR BLD AUTO: 13 % (ref 4–12)
NEUTROPHILS # BLD AUTO: 1.11 THOUSANDS/ΜL (ref 1.85–7.62)
NEUTS SEG NFR BLD AUTO: 28 % (ref 43–75)
NRBC BLD AUTO-RTO: 0 /100 WBCS
PLATELET # BLD AUTO: 123 THOUSANDS/UL (ref 149–390)
PMV BLD AUTO: 11.1 FL (ref 8.9–12.7)
RBC # BLD AUTO: 5.04 MILLION/UL (ref 3.88–5.62)
WBC # BLD AUTO: 3.99 THOUSAND/UL (ref 4.31–10.16)

## 2021-12-16 PROCEDURE — 85025 COMPLETE CBC W/AUTO DIFF WBC: CPT

## 2021-12-16 PROCEDURE — 77014 CHG CT GUIDANCE PLACEMENT RAD THERAPY FIELDS: CPT | Performed by: RADIOLOGY

## 2021-12-16 PROCEDURE — 36415 COLL VENOUS BLD VENIPUNCTURE: CPT

## 2021-12-16 PROCEDURE — 77385 HB NTSTY MODUL RAD TX DLVR SMPL: CPT | Performed by: RADIOLOGY

## 2021-12-17 PROCEDURE — 77385 HB NTSTY MODUL RAD TX DLVR SMPL: CPT | Performed by: RADIOLOGY

## 2021-12-20 PROCEDURE — 77014 CHG CT GUIDANCE PLACEMENT RAD THERAPY FIELDS: CPT | Performed by: RADIOLOGY

## 2021-12-20 PROCEDURE — 77385 HB NTSTY MODUL RAD TX DLVR SMPL: CPT | Performed by: RADIOLOGY

## 2021-12-21 PROCEDURE — 77014 CHG CT GUIDANCE PLACEMENT RAD THERAPY FIELDS: CPT | Performed by: INTERNAL MEDICINE

## 2021-12-21 PROCEDURE — 77385 HB NTSTY MODUL RAD TX DLVR SMPL: CPT | Performed by: INTERNAL MEDICINE

## 2021-12-21 PROCEDURE — 77336 RADIATION PHYSICS CONSULT: CPT | Performed by: RADIOLOGY

## 2021-12-22 PROCEDURE — 77385 HB NTSTY MODUL RAD TX DLVR SMPL: CPT | Performed by: RADIOLOGY

## 2021-12-22 PROCEDURE — 77014 CHG CT GUIDANCE PLACEMENT RAD THERAPY FIELDS: CPT | Performed by: RADIOLOGY

## 2021-12-22 PROCEDURE — 77427 RADIATION TX MANAGEMENT X5: CPT | Performed by: RADIOLOGY

## 2021-12-23 PROCEDURE — 77385 HB NTSTY MODUL RAD TX DLVR SMPL: CPT | Performed by: RADIOLOGY

## 2021-12-23 PROCEDURE — 77014 CHG CT GUIDANCE PLACEMENT RAD THERAPY FIELDS: CPT | Performed by: RADIOLOGY

## 2021-12-27 PROCEDURE — 77385 HB NTSTY MODUL RAD TX DLVR SMPL: CPT | Performed by: RADIOLOGY

## 2021-12-27 PROCEDURE — 77014 CHG CT GUIDANCE PLACEMENT RAD THERAPY FIELDS: CPT | Performed by: RADIOLOGY

## 2021-12-28 PROCEDURE — 77385 HB NTSTY MODUL RAD TX DLVR SMPL: CPT | Performed by: INTERNAL MEDICINE

## 2021-12-28 PROCEDURE — 77014 CHG CT GUIDANCE PLACEMENT RAD THERAPY FIELDS: CPT | Performed by: INTERNAL MEDICINE

## 2021-12-29 PROCEDURE — 77336 RADIATION PHYSICS CONSULT: CPT | Performed by: RADIOLOGY

## 2021-12-29 PROCEDURE — 77385 HB NTSTY MODUL RAD TX DLVR SMPL: CPT | Performed by: RADIOLOGY

## 2021-12-29 PROCEDURE — 77014 CHG CT GUIDANCE PLACEMENT RAD THERAPY FIELDS: CPT | Performed by: RADIOLOGY

## 2021-12-30 PROCEDURE — 77385 HB NTSTY MODUL RAD TX DLVR SMPL: CPT | Performed by: RADIOLOGY

## 2021-12-30 PROCEDURE — 77427 RADIATION TX MANAGEMENT X5: CPT | Performed by: RADIOLOGY

## 2021-12-30 PROCEDURE — 77014 CHG CT GUIDANCE PLACEMENT RAD THERAPY FIELDS: CPT | Performed by: RADIOLOGY

## 2022-01-03 ENCOUNTER — APPOINTMENT (OUTPATIENT)
Dept: RADIATION ONCOLOGY | Facility: CLINIC | Age: 62
End: 2022-01-03
Attending: RADIOLOGY
Payer: COMMERCIAL

## 2022-01-03 PROCEDURE — 77014 CHG CT GUIDANCE PLACEMENT RAD THERAPY FIELDS: CPT | Performed by: RADIOLOGY

## 2022-01-03 PROCEDURE — 77385 HB NTSTY MODUL RAD TX DLVR SMPL: CPT | Performed by: RADIOLOGY

## 2022-01-04 PROCEDURE — 77385 HB NTSTY MODUL RAD TX DLVR SMPL: CPT | Performed by: RADIOLOGY

## 2022-01-04 PROCEDURE — 77014 CHG CT GUIDANCE PLACEMENT RAD THERAPY FIELDS: CPT | Performed by: RADIOLOGY

## 2022-01-05 PROCEDURE — 77385 HB NTSTY MODUL RAD TX DLVR SMPL: CPT | Performed by: RADIOLOGY

## 2022-01-05 PROCEDURE — 77014 CHG CT GUIDANCE PLACEMENT RAD THERAPY FIELDS: CPT | Performed by: RADIOLOGY

## 2022-01-06 ENCOUNTER — APPOINTMENT (OUTPATIENT)
Dept: RADIATION ONCOLOGY | Facility: CLINIC | Age: 62
End: 2022-01-06
Attending: RADIOLOGY
Payer: COMMERCIAL

## 2022-01-06 PROCEDURE — 77014 CHG CT GUIDANCE PLACEMENT RAD THERAPY FIELDS: CPT | Performed by: RADIOLOGY

## 2022-01-06 PROCEDURE — 77336 RADIATION PHYSICS CONSULT: CPT | Performed by: RADIOLOGY

## 2022-01-06 PROCEDURE — 77385 HB NTSTY MODUL RAD TX DLVR SMPL: CPT | Performed by: RADIOLOGY

## 2022-01-07 PROCEDURE — 77014 CHG CT GUIDANCE PLACEMENT RAD THERAPY FIELDS: CPT | Performed by: RADIOLOGY

## 2022-01-07 PROCEDURE — 77385 HB NTSTY MODUL RAD TX DLVR SMPL: CPT | Performed by: RADIOLOGY

## 2022-01-10 PROCEDURE — 77385 HB NTSTY MODUL RAD TX DLVR SMPL: CPT | Performed by: RADIOLOGY

## 2022-01-10 PROCEDURE — 77014 CHG CT GUIDANCE PLACEMENT RAD THERAPY FIELDS: CPT | Performed by: RADIOLOGY

## 2022-01-11 PROCEDURE — 77014 CHG CT GUIDANCE PLACEMENT RAD THERAPY FIELDS: CPT | Performed by: RADIOLOGY

## 2022-01-11 PROCEDURE — 77385 HB NTSTY MODUL RAD TX DLVR SMPL: CPT | Performed by: RADIOLOGY

## 2022-01-12 PROCEDURE — 77385 HB NTSTY MODUL RAD TX DLVR SMPL: CPT | Performed by: RADIOLOGY

## 2022-01-13 ENCOUNTER — PATIENT OUTREACH (OUTPATIENT)
Dept: CASE MANAGEMENT | Facility: OTHER | Age: 62
End: 2022-01-13

## 2022-01-13 ENCOUNTER — APPOINTMENT (OUTPATIENT)
Dept: RADIATION ONCOLOGY | Facility: CLINIC | Age: 62
End: 2022-01-13
Attending: RADIOLOGY
Payer: COMMERCIAL

## 2022-01-13 PROCEDURE — 77336 RADIATION PHYSICS CONSULT: CPT | Performed by: INTERNAL MEDICINE

## 2022-01-13 PROCEDURE — 77014 CHG CT GUIDANCE PLACEMENT RAD THERAPY FIELDS: CPT | Performed by: INTERNAL MEDICINE

## 2022-01-13 PROCEDURE — 77385 HB NTSTY MODUL RAD TX DLVR SMPL: CPT | Performed by: INTERNAL MEDICINE

## 2022-01-13 NOTE — PROGRESS NOTES
OSW placed supportive call to Ritesh today  Utilized [employer] # [id number]  Ritesh stated he was doing ok, but is experiencing some pain with urination  He stated he will be speaking to the doctor about this tomorrow  He continues to have daily radiation treatments at this time  Inquired about Medicaid application  Ritesh stated he sent additional required documentation in to the [place] within the past week or so  Provided direct contact number and offered additional support as needed  Pt appreciative

## 2022-01-14 ENCOUNTER — APPOINTMENT (OUTPATIENT)
Dept: RADIATION ONCOLOGY | Facility: CLINIC | Age: 62
End: 2022-01-14
Attending: RADIOLOGY
Payer: COMMERCIAL

## 2022-01-14 PROCEDURE — 77427 RADIATION TX MANAGEMENT X5: CPT | Performed by: RADIOLOGY

## 2022-01-14 PROCEDURE — 77014 CHG CT GUIDANCE PLACEMENT RAD THERAPY FIELDS: CPT | Performed by: RADIOLOGY

## 2022-01-14 PROCEDURE — 77385 HB NTSTY MODUL RAD TX DLVR SMPL: CPT | Performed by: RADIOLOGY

## 2022-01-17 PROCEDURE — 77014 CHG CT GUIDANCE PLACEMENT RAD THERAPY FIELDS: CPT | Performed by: RADIOLOGY

## 2022-01-17 PROCEDURE — 77385 HB NTSTY MODUL RAD TX DLVR SMPL: CPT | Performed by: RADIOLOGY

## 2022-01-18 PROCEDURE — 77385 HB NTSTY MODUL RAD TX DLVR SMPL: CPT | Performed by: RADIOLOGY

## 2022-01-18 PROCEDURE — 77014 CHG CT GUIDANCE PLACEMENT RAD THERAPY FIELDS: CPT | Performed by: RADIOLOGY

## 2022-01-19 ENCOUNTER — APPOINTMENT (OUTPATIENT)
Dept: RADIATION ONCOLOGY | Facility: CLINIC | Age: 62
End: 2022-01-19
Attending: RADIOLOGY
Payer: COMMERCIAL

## 2022-01-19 PROCEDURE — 77014 CHG CT GUIDANCE PLACEMENT RAD THERAPY FIELDS: CPT | Performed by: RADIOLOGY

## 2022-01-19 PROCEDURE — 77385 HB NTSTY MODUL RAD TX DLVR SMPL: CPT | Performed by: RADIOLOGY

## 2022-01-20 ENCOUNTER — APPOINTMENT (OUTPATIENT)
Dept: RADIATION ONCOLOGY | Facility: CLINIC | Age: 62
End: 2022-01-20
Attending: RADIOLOGY
Payer: COMMERCIAL

## 2022-01-20 DIAGNOSIS — C61 PROSTATE CANCER (HCC): Primary | ICD-10-CM

## 2022-01-20 PROCEDURE — 77336 RADIATION PHYSICS CONSULT: CPT | Performed by: RADIOLOGY

## 2022-01-20 PROCEDURE — 77385 HB NTSTY MODUL RAD TX DLVR SMPL: CPT | Performed by: RADIOLOGY

## 2022-01-20 PROCEDURE — 77014 CHG CT GUIDANCE PLACEMENT RAD THERAPY FIELDS: CPT | Performed by: RADIOLOGY

## 2022-01-20 RX ORDER — TAMSULOSIN HYDROCHLORIDE 0.4 MG/1
0.8 CAPSULE ORAL
Qty: 180 CAPSULE | Refills: 2 | Status: SHIPPED | OUTPATIENT
Start: 2022-01-20

## 2022-01-21 PROCEDURE — 77014 CHG CT GUIDANCE PLACEMENT RAD THERAPY FIELDS: CPT | Performed by: RADIOLOGY

## 2022-01-21 PROCEDURE — 77385 HB NTSTY MODUL RAD TX DLVR SMPL: CPT | Performed by: RADIOLOGY

## 2022-01-21 PROCEDURE — 77427 RADIATION TX MANAGEMENT X5: CPT | Performed by: RADIOLOGY

## 2022-01-24 PROCEDURE — 77385 HB NTSTY MODUL RAD TX DLVR SMPL: CPT | Performed by: RADIOLOGY

## 2022-01-24 PROCEDURE — 77014 CHG CT GUIDANCE PLACEMENT RAD THERAPY FIELDS: CPT | Performed by: RADIOLOGY

## 2022-01-25 PROCEDURE — 77014 CHG CT GUIDANCE PLACEMENT RAD THERAPY FIELDS: CPT | Performed by: RADIOLOGY

## 2022-01-25 PROCEDURE — 77385 HB NTSTY MODUL RAD TX DLVR SMPL: CPT | Performed by: RADIOLOGY

## 2022-01-26 ENCOUNTER — APPOINTMENT (OUTPATIENT)
Dept: RADIATION ONCOLOGY | Facility: CLINIC | Age: 62
End: 2022-01-26
Attending: RADIOLOGY
Payer: COMMERCIAL

## 2022-01-26 PROCEDURE — 77385 HB NTSTY MODUL RAD TX DLVR SMPL: CPT | Performed by: RADIOLOGY

## 2022-01-26 PROCEDURE — 77014 CHG CT GUIDANCE PLACEMENT RAD THERAPY FIELDS: CPT | Performed by: RADIOLOGY

## 2022-01-27 ENCOUNTER — APPOINTMENT (OUTPATIENT)
Dept: RADIATION ONCOLOGY | Facility: CLINIC | Age: 62
End: 2022-01-27
Attending: RADIOLOGY
Payer: COMMERCIAL

## 2022-01-27 PROCEDURE — 77385 HB NTSTY MODUL RAD TX DLVR SMPL: CPT | Performed by: RADIOLOGY

## 2022-01-27 PROCEDURE — 77336 RADIATION PHYSICS CONSULT: CPT | Performed by: RADIOLOGY

## 2022-01-28 ENCOUNTER — APPOINTMENT (OUTPATIENT)
Dept: RADIATION ONCOLOGY | Facility: CLINIC | Age: 62
End: 2022-01-28
Attending: RADIOLOGY
Payer: COMMERCIAL

## 2022-01-28 PROCEDURE — 77014 CHG CT GUIDANCE PLACEMENT RAD THERAPY FIELDS: CPT | Performed by: INTERNAL MEDICINE

## 2022-01-28 PROCEDURE — 77427 RADIATION TX MANAGEMENT X5: CPT | Performed by: RADIOLOGY

## 2022-01-28 PROCEDURE — 77385 HB NTSTY MODUL RAD TX DLVR SMPL: CPT | Performed by: INTERNAL MEDICINE

## 2022-01-31 ENCOUNTER — APPOINTMENT (OUTPATIENT)
Dept: RADIATION ONCOLOGY | Facility: CLINIC | Age: 62
End: 2022-01-31
Attending: RADIOLOGY
Payer: COMMERCIAL

## 2022-01-31 PROCEDURE — 77385 HB NTSTY MODUL RAD TX DLVR SMPL: CPT | Performed by: RADIOLOGY

## 2022-01-31 PROCEDURE — 77014 CHG CT GUIDANCE PLACEMENT RAD THERAPY FIELDS: CPT | Performed by: RADIOLOGY

## 2022-02-01 ENCOUNTER — APPOINTMENT (OUTPATIENT)
Dept: RADIATION ONCOLOGY | Facility: CLINIC | Age: 62
End: 2022-02-01
Attending: RADIOLOGY
Payer: COMMERCIAL

## 2022-02-01 PROCEDURE — 77014 CHG CT GUIDANCE PLACEMENT RAD THERAPY FIELDS: CPT | Performed by: RADIOLOGY

## 2022-02-01 PROCEDURE — 77385 HB NTSTY MODUL RAD TX DLVR SMPL: CPT | Performed by: RADIOLOGY

## 2022-02-02 ENCOUNTER — APPOINTMENT (OUTPATIENT)
Dept: RADIATION ONCOLOGY | Facility: CLINIC | Age: 62
End: 2022-02-02
Attending: RADIOLOGY
Payer: COMMERCIAL

## 2022-02-02 PROCEDURE — 77014 CHG CT GUIDANCE PLACEMENT RAD THERAPY FIELDS: CPT | Performed by: RADIOLOGY

## 2022-02-02 PROCEDURE — 77385 HB NTSTY MODUL RAD TX DLVR SMPL: CPT | Performed by: RADIOLOGY

## 2022-02-03 ENCOUNTER — APPOINTMENT (OUTPATIENT)
Dept: RADIATION ONCOLOGY | Facility: CLINIC | Age: 62
End: 2022-02-03
Attending: RADIOLOGY
Payer: COMMERCIAL

## 2022-02-03 PROCEDURE — 77336 RADIATION PHYSICS CONSULT: CPT | Performed by: RADIOLOGY

## 2022-02-03 PROCEDURE — 77014 CHG CT GUIDANCE PLACEMENT RAD THERAPY FIELDS: CPT | Performed by: RADIOLOGY

## 2022-02-03 PROCEDURE — 77385 HB NTSTY MODUL RAD TX DLVR SMPL: CPT | Performed by: RADIOLOGY

## 2022-02-04 ENCOUNTER — APPOINTMENT (OUTPATIENT)
Dept: RADIATION ONCOLOGY | Facility: CLINIC | Age: 62
End: 2022-02-04
Attending: RADIOLOGY
Payer: COMMERCIAL

## 2022-02-04 PROCEDURE — 77014 CHG CT GUIDANCE PLACEMENT RAD THERAPY FIELDS: CPT | Performed by: RADIOLOGY

## 2022-02-04 PROCEDURE — 77427 RADIATION TX MANAGEMENT X5: CPT | Performed by: RADIOLOGY

## 2022-02-04 PROCEDURE — 77385 HB NTSTY MODUL RAD TX DLVR SMPL: CPT | Performed by: RADIOLOGY

## 2022-02-07 ENCOUNTER — APPOINTMENT (OUTPATIENT)
Dept: RADIATION ONCOLOGY | Facility: CLINIC | Age: 62
End: 2022-02-07
Attending: RADIOLOGY
Payer: COMMERCIAL

## 2022-02-07 PROCEDURE — 77385 HB NTSTY MODUL RAD TX DLVR SMPL: CPT | Performed by: RADIOLOGY

## 2022-02-07 PROCEDURE — 77014 CHG CT GUIDANCE PLACEMENT RAD THERAPY FIELDS: CPT | Performed by: RADIOLOGY

## 2022-02-08 ENCOUNTER — APPOINTMENT (OUTPATIENT)
Dept: RADIATION ONCOLOGY | Facility: CLINIC | Age: 62
End: 2022-02-08
Attending: RADIOLOGY
Payer: COMMERCIAL

## 2022-02-08 PROCEDURE — 77385 HB NTSTY MODUL RAD TX DLVR SMPL: CPT | Performed by: RADIOLOGY

## 2022-02-09 ENCOUNTER — APPOINTMENT (OUTPATIENT)
Dept: RADIATION ONCOLOGY | Facility: CLINIC | Age: 62
End: 2022-02-09
Attending: RADIOLOGY
Payer: COMMERCIAL

## 2022-02-09 PROCEDURE — 77385 HB NTSTY MODUL RAD TX DLVR SMPL: CPT | Performed by: RADIOLOGY

## 2022-02-09 PROCEDURE — 77014 CHG CT GUIDANCE PLACEMENT RAD THERAPY FIELDS: CPT | Performed by: RADIOLOGY

## 2022-02-10 ENCOUNTER — APPOINTMENT (OUTPATIENT)
Dept: RADIATION ONCOLOGY | Facility: CLINIC | Age: 62
End: 2022-02-10
Attending: RADIOLOGY
Payer: COMMERCIAL

## 2022-02-10 DIAGNOSIS — C61 PROSTATE CANCER (HCC): Primary | ICD-10-CM

## 2022-02-10 PROCEDURE — 77014 CHG CT GUIDANCE PLACEMENT RAD THERAPY FIELDS: CPT | Performed by: RADIOLOGY

## 2022-02-10 PROCEDURE — 77385 HB NTSTY MODUL RAD TX DLVR SMPL: CPT | Performed by: RADIOLOGY

## 2022-02-10 PROCEDURE — 77336 RADIATION PHYSICS CONSULT: CPT | Performed by: RADIOLOGY

## 2022-02-11 ENCOUNTER — APPOINTMENT (OUTPATIENT)
Dept: RADIATION ONCOLOGY | Facility: CLINIC | Age: 62
End: 2022-02-11
Attending: RADIOLOGY
Payer: COMMERCIAL

## 2022-02-11 PROCEDURE — 77014 CHG CT GUIDANCE PLACEMENT RAD THERAPY FIELDS: CPT | Performed by: RADIOLOGY

## 2022-02-11 PROCEDURE — 77427 RADIATION TX MANAGEMENT X5: CPT | Performed by: RADIOLOGY

## 2022-02-11 PROCEDURE — 77385 HB NTSTY MODUL RAD TX DLVR SMPL: CPT | Performed by: RADIOLOGY

## 2022-02-14 ENCOUNTER — APPOINTMENT (OUTPATIENT)
Dept: RADIATION ONCOLOGY | Facility: CLINIC | Age: 62
End: 2022-02-14
Attending: RADIOLOGY
Payer: COMMERCIAL

## 2022-02-14 PROCEDURE — 77385 HB NTSTY MODUL RAD TX DLVR SMPL: CPT | Performed by: RADIOLOGY

## 2022-02-14 PROCEDURE — 77014 CHG CT GUIDANCE PLACEMENT RAD THERAPY FIELDS: CPT | Performed by: RADIOLOGY

## 2022-02-15 ENCOUNTER — APPOINTMENT (OUTPATIENT)
Dept: RADIATION ONCOLOGY | Facility: CLINIC | Age: 62
End: 2022-02-15
Payer: COMMERCIAL

## 2022-02-15 ENCOUNTER — APPOINTMENT (OUTPATIENT)
Dept: RADIATION ONCOLOGY | Facility: CLINIC | Age: 62
End: 2022-02-15
Attending: RADIOLOGY
Payer: COMMERCIAL

## 2022-02-15 PROCEDURE — 77014 CHG CT GUIDANCE PLACEMENT RAD THERAPY FIELDS: CPT | Performed by: RADIOLOGY

## 2022-02-15 PROCEDURE — 77385 HB NTSTY MODUL RAD TX DLVR SMPL: CPT | Performed by: RADIOLOGY

## 2022-02-16 ENCOUNTER — APPOINTMENT (OUTPATIENT)
Dept: RADIATION ONCOLOGY | Facility: CLINIC | Age: 62
End: 2022-02-16
Attending: RADIOLOGY
Payer: COMMERCIAL

## 2022-02-16 ENCOUNTER — APPOINTMENT (OUTPATIENT)
Dept: RADIATION ONCOLOGY | Facility: CLINIC | Age: 62
End: 2022-02-16
Payer: COMMERCIAL

## 2022-02-16 PROCEDURE — 77385 HB NTSTY MODUL RAD TX DLVR SMPL: CPT | Performed by: RADIOLOGY

## 2022-02-16 PROCEDURE — 77336 RADIATION PHYSICS CONSULT: CPT | Performed by: RADIOLOGY

## 2022-02-17 ENCOUNTER — APPOINTMENT (OUTPATIENT)
Dept: RADIATION ONCOLOGY | Facility: CLINIC | Age: 62
End: 2022-02-17
Attending: RADIOLOGY
Payer: COMMERCIAL

## 2022-02-17 ENCOUNTER — APPOINTMENT (OUTPATIENT)
Dept: RADIATION ONCOLOGY | Facility: CLINIC | Age: 62
End: 2022-02-17
Payer: COMMERCIAL

## 2022-03-08 ENCOUNTER — DOCUMENTATION (OUTPATIENT)
Dept: HEMATOLOGY ONCOLOGY | Facility: CLINIC | Age: 62
End: 2022-03-08

## 2022-03-08 NOTE — PROGRESS NOTES
recvd email from [address] wanting to know if the pt has ma    Checked promise by name & I wasn't celestine to find anything but  According to the chart pt has active medicaid w/an effective date 01/03/22  Emailed that info to [address]

## 2022-03-14 ENCOUNTER — PATIENT OUTREACH (OUTPATIENT)
Dept: CASE MANAGEMENT | Facility: OTHER | Age: 62
End: 2022-03-14

## 2022-03-14 NOTE — PROGRESS NOTES
OSW previously reached out to oncology finance counselor regarding pt's MA status  Received email from [place] pt is active with MA  OSW will close referral at this time, however should pt express any psychosocial needs in the future a referral to oncology SW can be placed

## 2022-03-21 ENCOUNTER — APPOINTMENT (OUTPATIENT)
Dept: LAB | Facility: HOSPITAL | Age: 62
End: 2022-03-21
Attending: RADIOLOGY
Payer: COMMERCIAL

## 2022-03-21 DIAGNOSIS — C61 PROSTATE CANCER (HCC): ICD-10-CM

## 2022-03-21 LAB — PSA SERPL-MCNC: <0.1 NG/ML (ref 0–4)

## 2022-03-21 PROCEDURE — 84153 ASSAY OF PSA TOTAL: CPT

## 2022-03-28 ENCOUNTER — TELEPHONE (OUTPATIENT)
Dept: RADIATION ONCOLOGY | Facility: CLINIC | Age: 62
End: 2022-03-28

## 2022-03-28 ENCOUNTER — CLINICAL SUPPORT (OUTPATIENT)
Dept: RADIATION ONCOLOGY | Facility: CLINIC | Age: 62
End: 2022-03-28
Attending: RADIOLOGY
Payer: COMMERCIAL

## 2022-03-28 VITALS
HEART RATE: 66 BPM | WEIGHT: 215 LBS | HEIGHT: 68 IN | SYSTOLIC BLOOD PRESSURE: 133 MMHG | DIASTOLIC BLOOD PRESSURE: 85 MMHG | BODY MASS INDEX: 32.58 KG/M2 | TEMPERATURE: 98.6 F | RESPIRATION RATE: 16 BRPM | OXYGEN SATURATION: 99 %

## 2022-03-28 DIAGNOSIS — C61 PROSTATE CANCER (HCC): Primary | ICD-10-CM

## 2022-03-28 PROCEDURE — 99211 OFF/OP EST MAY X REQ PHY/QHP: CPT | Performed by: RADIOLOGY

## 2022-03-28 PROCEDURE — 99213 OFFICE O/P EST LOW 20 MIN: CPT | Performed by: RADIOLOGY
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
## 2022-03-29 DIAGNOSIS — C61 PROSTATE CANCER (HCC): Primary | ICD-10-CM

## 2022-03-29 NOTE — TELEPHONE ENCOUNTER
----- Message from Valerie Dumont MD sent at 3/29/2022  7:44 AM EDT -----  Can we make sure patient has April appt for Lupron injection?  Will need [id number] injections for two years please  ----- Message -----  From: Ceasar Cisneros MD  Sent: 3/28/2022   4:54 PM EDT  To: Cecilia Melendez MA, Valerie Dumont MD, #

## 2022-03-29 NOTE — TELEPHONE ENCOUNTER
Juan Schuster with [employer] our pre authorization person if we can us office stock since it is a new year    Thanks

## 2022-03-30 NOTE — TELEPHONE ENCOUNTER
Autumn Bustillos  Yes he will require prior auth since he has Medical Assistance  They require the NDC# for the drug do you have this?    Thanks  Evangelista Rodríguez

## 2022-03-31 NOTE — TELEPHONE ENCOUNTER
Pharmacy called for RX for Lupron now that prior Auth was received    [facility]   Fax   [phone]

## 2022-03-31 NOTE — TELEPHONE ENCOUNTER
Vikas needs prior auth and ordered through specialty pharmacy  Faxing clinicals and specialty pharmacy is [facility] phone# [phone]

## 2022-04-05 NOTE — TELEPHONE ENCOUNTER
[facility] called to advise that they have the prior authorization and needs the demographic and Rx sent over to them for the Lupron       Fax# [phone]

## 2022-04-08 NOTE — TELEPHONE ENCOUNTER
Called and spoke with Stella Galaviz from [facility] [phone]  Pharmacy called patient and left a message asking patient to return call  Pharmacy will not ship until they speak with the patient  Will check next week

## 2022-04-13 NOTE — TELEPHONE ENCOUNTER
Called and spoke with José Miguel Richey form [facility]  Per [facility] shipping tomorrow and [facility] should receive medication on Friday

## 2022-04-13 NOTE — TELEPHONE ENCOUNTER
[address] [employer] is shipping Lupron on Thursday and I should receive it on Friday  Once I receive the medication I will call patient    Thanks

## 2022-04-15 NOTE — TELEPHONE ENCOUNTER
Called and spoke with patient using [employer] [phone] to schedule an appointment for his Lupron injection  Patient scheduled 04/22/2022 at 0930 at the [facility] office  Patient aware of office location

## 2022-04-22 ENCOUNTER — TELEPHONE (OUTPATIENT)
Dept: UROLOGY | Facility: CLINIC | Age: 62
End: 2022-04-22

## 2022-04-22 ENCOUNTER — PROCEDURE VISIT (OUTPATIENT)
Dept: UROLOGY | Facility: CLINIC | Age: 62
End: 2022-04-22
Payer: COMMERCIAL

## 2022-04-22 VITALS
BODY MASS INDEX: 32.74 KG/M2 | WEIGHT: 216 LBS | HEIGHT: 68 IN | RESPIRATION RATE: 20 BRPM | HEART RATE: 68 BPM | DIASTOLIC BLOOD PRESSURE: 80 MMHG | SYSTOLIC BLOOD PRESSURE: 120 MMHG

## 2022-04-22 DIAGNOSIS — C61 PROSTATE CANCER (HCC): Primary | ICD-10-CM

## 2022-04-22 DIAGNOSIS — C61 PROSTATE CANCER (HCC): ICD-10-CM

## 2022-04-22 PROCEDURE — 96402 CHEMO HORMON ANTINEOPL SQ/IM: CPT

## 2022-04-22 NOTE — PROGRESS NOTES
4/22/2022  Carlee Fulton is a 64 y o  male  [id number]    Diagnosis:  Chief Complaint     Prostate Cancer; Lupron injection          Patient presents for Lupron injection  managed by Dr Kaylyn Contreras  Patient seen today with his nephew who provided  translation  Plan:  Follow up in 6 months for AP visit and Lupron  Patient to obtain PSA prior to visit  Medication administration:  Lupron 45 mg given IM right buttocks without incident    Patient tolerated procedure well  Lupron supplied by patient      Vitals:    04/22/22 0929   BP: 120/80   Pulse: 68   Resp: 20   Weight: 98 kg (216 lb)   Height: 5' 8" (1 727 m)         Esdras Narayan RN

## 2022-04-29 ENCOUNTER — DOCUMENTATION (OUTPATIENT)
Dept: HEMATOLOGY ONCOLOGY | Facility: CLINIC | Age: 62
End: 2022-04-29

## 2022-04-29 NOTE — PROGRESS NOTES
Patient received ADT on 4/22/22  He has urology follow up on 10/28/22, post radiation  This is appropriate

## 2022-09-07 NOTE — TELEPHONE ENCOUNTER
Insurance last confirmed September 2021  Please confirm if any changes with ordering from specialty pharmacy

## 2022-09-16 ENCOUNTER — APPOINTMENT (OUTPATIENT)
Dept: LAB | Facility: HOSPITAL | Age: 62
End: 2022-09-16
Payer: COMMERCIAL

## 2022-09-16 DIAGNOSIS — C61 PROSTATE CANCER (HCC): ICD-10-CM

## 2022-09-16 LAB — PSA SERPL-MCNC: <0.1 NG/ML (ref 0–4)

## 2022-09-16 PROCEDURE — 84153 ASSAY OF PSA TOTAL: CPT

## 2022-09-26 ENCOUNTER — RADIATION ONCOLOGY FOLLOW-UP (OUTPATIENT)
Dept: RADIATION ONCOLOGY | Facility: CLINIC | Age: 62
End: 2022-09-26
Attending: RADIOLOGY
Payer: COMMERCIAL

## 2022-09-26 VITALS
TEMPERATURE: 97.5 F | BODY MASS INDEX: 32.81 KG/M2 | OXYGEN SATURATION: 98 % | DIASTOLIC BLOOD PRESSURE: 84 MMHG | RESPIRATION RATE: 16 BRPM | HEIGHT: 68 IN | WEIGHT: 216.5 LBS | HEART RATE: 72 BPM | SYSTOLIC BLOOD PRESSURE: 128 MMHG

## 2022-09-26 DIAGNOSIS — C61 PROSTATE CANCER (HCC): Primary | ICD-10-CM

## 2022-09-26 PROCEDURE — 99213 OFFICE O/P EST LOW 20 MIN: CPT | Performed by: RADIOLOGY

## 2022-09-26 PROCEDURE — 99211 OFF/OP EST MAY X REQ PHY/QHP: CPT | Performed by: RADIOLOGY

## 2022-09-26 RX ORDER — TAMSULOSIN HYDROCHLORIDE 0.4 MG/1
0.8 CAPSULE ORAL
Qty: 180 CAPSULE | Refills: 3 | Status: SHIPPED | OUTPATIENT
Start: 2022-09-26
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
## 2022-09-27 NOTE — TELEPHONE ENCOUNTER
rx sent  Jessica aware to lookout for delivery  Injection admin will be 10/28 at [address] with me here

## 2022-09-27 NOTE — TELEPHONE ENCOUNTER
LUPRON 45mg - [address] (Auth#: [id number]) and valid from 3/31/22 until 3/30/23 for 2 fills [last used for DOS 4/22/22]  MUST order drug through [facility]  Script for the requested medication (Lupron [place] 45mg Kit) was queued and forwarded to the Advanced Practitioner covering the [address] [address] location for approval     Already spoke with Lorin at [facility] and verified the correct delivery address  They are also aware of his next appointment

## 2022-10-28 ENCOUNTER — OFFICE VISIT (OUTPATIENT)
Dept: UROLOGY | Facility: CLINIC | Age: 62
End: 2022-10-28

## 2022-10-28 VITALS
HEART RATE: 80 BPM | DIASTOLIC BLOOD PRESSURE: 80 MMHG | BODY MASS INDEX: 31.83 KG/M2 | WEIGHT: 210 LBS | HEIGHT: 68 IN | SYSTOLIC BLOOD PRESSURE: 122 MMHG

## 2022-10-28 DIAGNOSIS — C61 PROSTATE CANCER (HCC): Primary | ICD-10-CM

## 2022-10-28 NOTE — PROGRESS NOTES
10/28/2022      Chief Complaint   Patient presents with   • Follow-up   • Prostate Cancer     Lupron         Assessment and Plan    58 y o  male managed by Dr Jayde Solorzano    1  [place] 8 prostate cancer  - attempted/aborted surgery 9/2021  - completed primary XRT 2/2022  - plan 2 years ADT total for high risk disease    Lupron 6 month depot dates  10/13/2021  4/22/2022  10/28/2022 (today)    Lab Results   Component Value Date    PSA <0 1 09/16/2022    PSA <0 1 03/21/2022     [place] CYRACOM [id number] used for entirety of visit today    Chauncey Campo is doing well  Some urinary frequency is improving with more time after radiation  He continues to use tamsulosin  He does endorse hot flashes related to androgen deprivation, no notable fatigue or gynecomastia  He is overall staying active and well  No hematuria or rectal bleeding  He continues to see the radiation team every six months, will return to see us as well in April with another PSA he will receive his final Lupron injection on that visit  History of Present Illness  Audelia Richey is a 58 y o  male here for evaluation of prostate cancer follow-up, after radiation, due for Lupron today  Patient with high risk disease, unsuccessful attempted radical prostatectomy in the operating room related to adhesions and prior exploratory laparotomy procedure was aborted  He was referred for radiation treatment with planned two years of androgen deprivation therapy  He completed XRT in February 2022  Review of Systems   Constitutional: Negative for activity change, appetite change, chills, fever and unexpected weight change  HENT: Negative  Respiratory: Negative  Negative for shortness of breath  Cardiovascular: Negative  Negative for chest pain  Gastrointestinal: Negative for abdominal pain, diarrhea, nausea and vomiting  Endocrine: Negative  Hot flashes   Genitourinary: Positive for frequency   Negative for decreased urine volume, difficulty urinating, dysuria, flank pain, hematuria, scrotal swelling, testicular pain and urgency  Musculoskeletal: Negative for back pain and gait problem  Skin: Negative  Allergic/Immunologic: Negative  Neurological: Negative  Hematological: Negative for adenopathy  Does not bruise/bleed easily  Vitals  Vitals:    10/28/22 0904   BP: 122/80   BP Location: Left arm   Patient Position: Sitting   Cuff Size: Adult   Pulse: 80   Weight: 95 3 kg (210 lb)   Height: 5' 8" (1 727 m)       Physical Exam  Vitals and nursing note reviewed  Constitutional:       General: He is not in acute distress  Appearance: He is well-developed  He is not diaphoretic  HENT:      Head: Normocephalic and atraumatic  Pulmonary:      Effort: Pulmonary effort is normal    Skin:     General: Skin is warm and dry  Neurological:      Mental Status: He is alert and oriented to person, place, and time        Gait: Gait normal    Psychiatric:         Speech: Speech normal          Behavior: Behavior normal            Past History  Past Medical History:   Diagnosis Date   • Cancer (HCC)     Prostate   • Prostate cancer ([place] [address] )    • Thrombocytopenia ([facility] )      Social History     Socioeconomic History   • Marital status: Single     Spouse name: None   • Number of children: None   • Years of education: None   • Highest education level: None   Occupational History   • None   Tobacco Use   • Smoking status: Never Smoker   • Smokeless tobacco: Never Used   Vaping Use   • Vaping Use: Never used   Substance and Sexual Activity   • Alcohol use: Yes     Comment: weekends   • Drug use: Never   • Sexual activity: Yes     Partners: Female   Other Topics Concern   • None   Social History Narrative   • None     Social Determinants of Health     Financial Resource Strain: Not on file   Food Insecurity: Not on file   Transportation Needs: Not on file   Physical Activity: Not on file   Stress: Not on file   Social Connections: Not on file   Intimate Partner Violence: Not on file   Housing Stability: Not on file     Social History     Tobacco Use   Smoking Status Never Smoker   Smokeless Tobacco Never Used     Family History   Problem Relation Age of Onset   • Heart disease Mother    • Stroke Father    • Diabetes Sister    • Heart disease Sister    • Stroke Sister    • Diabetes Brother    • Heart disease Brother    • Stroke Brother    • No Known Problems Son    • No Known Problems Daughter        The following portions of the patient's history were reviewed and updated as appropriate: allergies, current medications, past medical history, past social history, past surgical history and problem list     Results  No results found for this or any previous visit (from the past 1 hour(s)) ]  Lab Results   Component Value Date    PSA <0 1 09/16/2022    PSA <0 1 03/21/2022     Lab Results   Component Value Date    CALCIUM 8 4 10/08/2021    K 3 8 10/08/2021    CO2 27 10/08/2021     10/08/2021    BUN 12 10/08/2021    CREATININE 1 01 10/08/2021     Lab Results   Component Value Date    WBC 3 99 (L) 12/16/2021    HGB 14 1 12/16/2021    HCT 42 3 12/16/2021    MCV 84 12/16/2021     (L) 12/16/2021

## 2023-03-16 ENCOUNTER — APPOINTMENT (OUTPATIENT)
Dept: LAB | Facility: HOSPITAL | Age: 63
End: 2023-03-16

## 2023-03-16 DIAGNOSIS — C61 PROSTATE CANCER (HCC): ICD-10-CM

## 2023-03-16 LAB — PSA SERPL-MCNC: <0.1 NG/ML (ref 0–4)

## 2023-03-30 ENCOUNTER — CLINICAL SUPPORT (OUTPATIENT)
Dept: RADIATION ONCOLOGY | Facility: CLINIC | Age: 63
End: 2023-03-30
Attending: RADIOLOGY

## 2023-03-30 ENCOUNTER — RADIATION ONCOLOGY FOLLOW-UP (OUTPATIENT)
Dept: RADIATION ONCOLOGY | Facility: CLINIC | Age: 63
End: 2023-03-30
Attending: RADIOLOGY

## 2023-03-30 VITALS
DIASTOLIC BLOOD PRESSURE: 86 MMHG | HEIGHT: 68 IN | TEMPERATURE: 97.2 F | SYSTOLIC BLOOD PRESSURE: 152 MMHG | BODY MASS INDEX: 32.74 KG/M2 | HEART RATE: 66 BPM | WEIGHT: 216.05 LBS | OXYGEN SATURATION: 99 %

## 2023-03-30 DIAGNOSIS — C61 PROSTATE CANCER (HCC): Primary | ICD-10-CM

## 2023-03-30 DIAGNOSIS — C61 PROSTATE CANCER (HCC): ICD-10-CM

## 2023-03-30 RX ORDER — TAMSULOSIN HYDROCHLORIDE 0.4 MG/1
0.8 CAPSULE ORAL
Qty: 180 CAPSULE | Refills: 3 | Status: SHIPPED | OUTPATIENT
Start: 2023-03-30
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
## (undated) DEVICE — SUT PDS II 0 CT-1 27 IN Z340H

## (undated) DEVICE — CATH FOLEY 20FR 5ML 2 WAY SILICONE ELASTIMER

## (undated) DEVICE — STERILE SURGICAL LUBRICANT,  TUBE: Brand: SURGILUBE

## (undated) DEVICE — KERLIX BANDAGE ROLL: Brand: KERLIX

## (undated) DEVICE — GLOVE INDICATOR PI UNDERGLOVE SZ 8 BLUE

## (undated) DEVICE — MONOPOLAR CURVED SCISSORS: Brand: ENDOWRIST;DAVINCI SI

## (undated) DEVICE — ULNAR NERVE PROTECTOR FOAM POSITIONER: Brand: CARDINAL HEALTH

## (undated) DEVICE — SUT VICRYL 2-0 REEL 54 IN J286G

## (undated) DEVICE — SURGIFLO ENDOSCOPIC APPICATOR: Brand: ETHICON

## (undated) DEVICE — JP CHANNEL DRAIN 19FR, FULL FLUTES: Brand: JACKSON-PRATT

## (undated) DEVICE — SUT MONOCRYL 4-0 PS-2 27 IN Y426H

## (undated) DEVICE — AIRSEAL TUBE SMOKE EVAC LUMENX3 FILTERED

## (undated) DEVICE — VESSEL SEALER: Brand: ENDOWRIST;DAVINCI SI

## (undated) DEVICE — BIPOLAR CORD DISP

## (undated) DEVICE — INTENDED FOR TISSUE SEPARATION, AND OTHER PROCEDURES THAT REQUIRE A SHARP SURGICAL BLADE TO PUNCTURE OR CUT.: Brand: BARD-PARKER SAFETY BLADES SIZE 11, STERILE

## (undated) DEVICE — JACKSON-PRATT 100CC BULB RESERVOIR: Brand: CARDINAL HEALTH

## (undated) DEVICE — CATH FOLEY 18FR 5ML 2 WAY SILICONE ELASTIMER

## (undated) DEVICE — SUT VLOC 90 3-0 90 CV-23 L9 IN VLOCM1944

## (undated) DEVICE — DRAPE SHEET THREE QUARTER

## (undated) DEVICE — ROBOT INST CANNULA 8MM OBTURATOR BLUNT DISP

## (undated) DEVICE — BASIC SINGLE BASIN 2-LF: Brand: MEDLINE INDUSTRIES, INC.

## (undated) DEVICE — TROCAR: Brand: KII FIOS FIRST ENTRY

## (undated) DEVICE — MARYLAND BIPOLAR FORCEPS: Brand: ENDOWRIST;DAVINCI SI

## (undated) DEVICE — 3M™ DURAPORE™ SURGICAL TAPE 2 INCHES X 10YARDS (5.0CM X 9.1M) 6ROLLS/CARTON 10CARTONS/CASE 1538-2: Brand: 3M™ DURAPORE™

## (undated) DEVICE — ENDOPATH PNEUMONEEDLE INSUFFLATION NEEDLES WITH LUER LOCK CONNECTORS 120MM: Brand: ENDOPATH

## (undated) DEVICE — GLOVE SRG BIOGEL 8

## (undated) DEVICE — SURGICEL 4 X 8

## (undated) DEVICE — LIGHT HANDLE COVER SLEEVE DISP BLUE STELLAR

## (undated) DEVICE — KIT, BETHLEHEM ROBOTIC PROST: Brand: CARDINAL HEALTH

## (undated) DEVICE — SYRINGE 10ML LL

## (undated) DEVICE — LARGE NEEDLE DRIVER: Brand: ENDOWRIST;DAVINCI SI

## (undated) DEVICE — TISSUE RETRIEVAL SYSTEM: Brand: INZII RETRIEVAL SYSTEM

## (undated) DEVICE — PAD GROUNDING ADULT

## (undated) DEVICE — CHLORAPREP HI-LITE 26ML ORANGE

## (undated) DEVICE — CLOSURE DEVICE ENDO CLOSE

## (undated) DEVICE — URIMETER 2500ML

## (undated) DEVICE — SUT VLOC 90 3-0 CV-23 9 IN VLOCM0844

## (undated) DEVICE — TIP COVER ACCESSORY

## (undated) DEVICE — POSITIONER EGGCRATE

## (undated) DEVICE — SUT ETHILON 3-0 FS-1 18 IN 663G

## (undated) DEVICE — PROGRASP FORCEPS: Brand: ENDOWRIST;DAVINCI SI

## (undated) DEVICE — ROBOT ACCESSORY KIT 4 ARM

## (undated) DEVICE — ADHESIVE SKIN HIGH VISCOSITY EXOFIN 1ML

## (undated) DEVICE — NEEDLE 25G X 1 1/2

## (undated) DEVICE — VISUALIZATION SYSTEM: Brand: CLEARIFY

## (undated) DEVICE — HEMOSTATIC MATRIX SURGIFLO 8ML W/THROMBIN

## (undated) DEVICE — ASTOUND STANDARD SURGICAL GOWN, XL: Brand: CONVERTORS

## (undated) DEVICE — SUT VICRYL 0 CT-1 27 IN J260H